# Patient Record
Sex: MALE | Race: WHITE | NOT HISPANIC OR LATINO | URBAN - NONMETROPOLITAN AREA
[De-identification: names, ages, dates, MRNs, and addresses within clinical notes are randomized per-mention and may not be internally consistent; named-entity substitution may affect disease eponyms.]

---

## 2023-10-15 ENCOUNTER — TRANSCRIBE ORDERS (OUTPATIENT)
Dept: ADMINISTRATIVE | Facility: HOSPITAL | Age: 73
End: 2023-10-15

## 2023-10-15 DIAGNOSIS — R94.2 ABNORMAL RESULTS OF PULMONARY FUNCTION STUDIES: ICD-10-CM

## 2023-10-15 DIAGNOSIS — J45.40 MODERATE PERSISTENT ASTHMA, UNCOMPLICATED: ICD-10-CM

## 2023-10-15 DIAGNOSIS — J30.1 ALLERGIC RHINITIS DUE TO POLLEN, UNSPECIFIED SEASONALITY: ICD-10-CM

## 2023-10-15 DIAGNOSIS — J30.89 OTHER ALLERGIC RHINITIS: Primary | ICD-10-CM

## 2023-10-15 DIAGNOSIS — R06.2 WHEEZING: ICD-10-CM

## 2024-07-12 ENCOUNTER — APPOINTMENT (OUTPATIENT)
Dept: CT IMAGING | Facility: HOSPITAL | Age: 74
End: 2024-07-12
Payer: MEDICARE

## 2024-07-12 ENCOUNTER — APPOINTMENT (OUTPATIENT)
Dept: GENERAL RADIOLOGY | Facility: HOSPITAL | Age: 74
End: 2024-07-12
Payer: MEDICARE

## 2024-07-12 ENCOUNTER — HOSPITAL ENCOUNTER (EMERGENCY)
Facility: HOSPITAL | Age: 74
Discharge: HOME | End: 2024-07-12
Payer: MEDICARE

## 2024-07-12 VITALS
WEIGHT: 210 LBS | HEIGHT: 70 IN | HEART RATE: 63 BPM | OXYGEN SATURATION: 98 % | SYSTOLIC BLOOD PRESSURE: 125 MMHG | DIASTOLIC BLOOD PRESSURE: 66 MMHG | BODY MASS INDEX: 30.06 KG/M2 | TEMPERATURE: 97.8 F | RESPIRATION RATE: 16 BRPM

## 2024-07-12 DIAGNOSIS — R06.02 SHORTNESS OF BREATH: Primary | ICD-10-CM

## 2024-07-12 LAB
ALBUMIN SERPL-MCNC: 4.2 G/DL (ref 3.5–5.2)
ALBUMIN/GLOB SERPL: 1 G/DL
ALP SERPL-CCNC: 94 U/L (ref 39–117)
ALT SERPL W P-5'-P-CCNC: 16 U/L (ref 1–41)
ANION GAP SERPL CALCULATED.3IONS-SCNC: 10 MMOL/L (ref 5–15)
APTT PPP: 31.3 SECONDS (ref 24.5–36)
ARTERIAL PATENCY WRIST A: POSITIVE
AST SERPL-CCNC: 23 U/L (ref 1–40)
ATMOSPHERIC PRESS: 755 MMHG
B PARAPERT DNA SPEC QL NAA+PROBE: NOT DETECTED
B PERT DNA SPEC QL NAA+PROBE: NOT DETECTED
BASE EXCESS BLDA CALC-SCNC: 4.4 MMOL/L (ref 0–2)
BASOPHILS # BLD AUTO: 0.03 10*3/MM3 (ref 0–0.2)
BASOPHILS NFR BLD AUTO: 0.4 % (ref 0–1.5)
BDY SITE: ABNORMAL
BILIRUB SERPL-MCNC: 0.4 MG/DL (ref 0–1.2)
BODY TEMPERATURE: 37
BUN SERPL-MCNC: 19 MG/DL (ref 8–23)
BUN/CREAT SERPL: 15.3 (ref 7–25)
C PNEUM DNA NPH QL NAA+NON-PROBE: NOT DETECTED
CA-I BLD-MCNC: 4.7 MG/DL (ref 4.6–5.4)
CALCIUM SPEC-SCNC: 9.5 MG/DL (ref 8.6–10.5)
CHLORIDE SERPL-SCNC: 101 MMOL/L (ref 98–107)
CO2 SERPL-SCNC: 31 MMOL/L (ref 22–29)
COHGB MFR BLD: 0.8 % (ref 0–5)
CREAT SERPL-MCNC: 1.24 MG/DL (ref 0.76–1.27)
D-LACTATE SERPL-SCNC: 1.4 MMOL/L (ref 0.5–2)
DEPRECATED RDW RBC AUTO: 41.7 FL (ref 37–54)
EGFRCR SERPLBLD CKD-EPI 2021: 61.8 ML/MIN/1.73
EOSINOPHIL # BLD AUTO: 0.14 10*3/MM3 (ref 0–0.4)
EOSINOPHIL NFR BLD AUTO: 1.9 % (ref 0.3–6.2)
ERYTHROCYTE [DISTWIDTH] IN BLOOD BY AUTOMATED COUNT: 12.8 % (ref 12.3–15.4)
FLUAV SUBTYP SPEC NAA+PROBE: NOT DETECTED
FLUBV RNA ISLT QL NAA+PROBE: NOT DETECTED
GLOBULIN UR ELPH-MCNC: 4.3 GM/DL
GLUCOSE SERPL-MCNC: 100 MG/DL (ref 65–99)
HADV DNA SPEC NAA+PROBE: NOT DETECTED
HCO3 BLDA-SCNC: 27.9 MMOL/L (ref 20–26)
HCOV 229E RNA SPEC QL NAA+PROBE: NOT DETECTED
HCOV HKU1 RNA SPEC QL NAA+PROBE: NOT DETECTED
HCOV NL63 RNA SPEC QL NAA+PROBE: NOT DETECTED
HCOV OC43 RNA SPEC QL NAA+PROBE: NOT DETECTED
HCT VFR BLD AUTO: 42.7 % (ref 37.5–51)
HCT VFR BLD CALC: 42.9 % (ref 38–51)
HGB BLD-MCNC: 14 G/DL (ref 13–17.7)
HGB BLDA-MCNC: 14 G/DL (ref 14–18)
HMPV RNA NPH QL NAA+NON-PROBE: NOT DETECTED
HOLD SPECIMEN: NORMAL
HPIV1 RNA ISLT QL NAA+PROBE: NOT DETECTED
HPIV2 RNA SPEC QL NAA+PROBE: NOT DETECTED
HPIV3 RNA NPH QL NAA+PROBE: NOT DETECTED
HPIV4 P GENE NPH QL NAA+PROBE: NOT DETECTED
IMM GRANULOCYTES # BLD AUTO: 0.03 10*3/MM3 (ref 0–0.05)
IMM GRANULOCYTES NFR BLD AUTO: 0.4 % (ref 0–0.5)
INR PPP: 0.99 (ref 0.91–1.09)
LYMPHOCYTES # BLD AUTO: 1.89 10*3/MM3 (ref 0.7–3.1)
LYMPHOCYTES NFR BLD AUTO: 25.2 % (ref 19.6–45.3)
Lab: ABNORMAL
M PNEUMO IGG SER IA-ACNC: NOT DETECTED
MCH RBC QN AUTO: 29.2 PG (ref 26.6–33)
MCHC RBC AUTO-ENTMCNC: 32.8 G/DL (ref 31.5–35.7)
MCV RBC AUTO: 89.1 FL (ref 79–97)
METHGB BLD QL: 0 % (ref 0–3)
MODALITY: ABNORMAL
MONOCYTES # BLD AUTO: 0.55 10*3/MM3 (ref 0.1–0.9)
MONOCYTES NFR BLD AUTO: 7.3 % (ref 5–12)
NEUTROPHILS NFR BLD AUTO: 4.86 10*3/MM3 (ref 1.7–7)
NEUTROPHILS NFR BLD AUTO: 64.8 % (ref 42.7–76)
NRBC BLD AUTO-RTO: 0 /100 WBC (ref 0–0.2)
NT-PROBNP SERPL-MCNC: 515.4 PG/ML (ref 0–900)
OXYHGB MFR BLDV: 95.5 % (ref 94–99)
PCO2 BLDA: 37 MM HG (ref 35–45)
PCO2 TEMP ADJ BLD: 37 MM HG (ref 35–45)
PH BLDA: 7.49 PH UNITS (ref 7.35–7.45)
PH, TEMP CORRECTED: 7.49 PH UNITS (ref 7.35–7.45)
PLATELET # BLD AUTO: 190 10*3/MM3 (ref 140–450)
PMV BLD AUTO: 10.5 FL (ref 6–12)
PO2 BLDA: 76 MM HG (ref 83–108)
PO2 TEMP ADJ BLD: 76 MM HG (ref 83–108)
POTASSIUM BLDA-SCNC: 3.2 MMOL/L (ref 3.5–5.2)
POTASSIUM SERPL-SCNC: 3.3 MMOL/L (ref 3.5–5.2)
PROT SERPL-MCNC: 8.5 G/DL (ref 6–8.5)
PROTHROMBIN TIME: 13.5 SECONDS (ref 11.8–14.8)
QT INTERVAL: 472 MS
QTC INTERVAL: 435 MS
RBC # BLD AUTO: 4.79 10*6/MM3 (ref 4.14–5.8)
RHINOVIRUS RNA SPEC NAA+PROBE: NOT DETECTED
RSV RNA NPH QL NAA+NON-PROBE: NOT DETECTED
SAO2 % BLDCOA: 96.2 % (ref 94–99)
SARS-COV-2 RNA NPH QL NAA+NON-PROBE: NOT DETECTED
SODIUM BLDA-SCNC: 143 MMOL/L (ref 136–145)
SODIUM SERPL-SCNC: 142 MMOL/L (ref 136–145)
TROPONIN T SERPL HS-MCNC: 18 NG/L
VENTILATOR MODE: ABNORMAL
WBC NRBC COR # BLD AUTO: 7.5 10*3/MM3 (ref 3.4–10.8)
WHOLE BLOOD HOLD COAG: NORMAL
WHOLE BLOOD HOLD SPECIMEN: NORMAL

## 2024-07-12 PROCEDURE — 36600 WITHDRAWAL OF ARTERIAL BLOOD: CPT

## 2024-07-12 PROCEDURE — 82375 ASSAY CARBOXYHB QUANT: CPT

## 2024-07-12 PROCEDURE — 25510000001 IOPAMIDOL 61 % SOLUTION: Performed by: NURSE PRACTITIONER

## 2024-07-12 PROCEDURE — 83880 ASSAY OF NATRIURETIC PEPTIDE: CPT | Performed by: NURSE PRACTITIONER

## 2024-07-12 PROCEDURE — 71260 CT THORAX DX C+: CPT

## 2024-07-12 PROCEDURE — 25010000002 FUROSEMIDE PER 20 MG: Performed by: NURSE PRACTITIONER

## 2024-07-12 PROCEDURE — 83050 HGB METHEMOGLOBIN QUAN: CPT

## 2024-07-12 PROCEDURE — 93005 ELECTROCARDIOGRAM TRACING: CPT | Performed by: FAMILY MEDICINE

## 2024-07-12 PROCEDURE — 84484 ASSAY OF TROPONIN QUANT: CPT | Performed by: NURSE PRACTITIONER

## 2024-07-12 PROCEDURE — 83605 ASSAY OF LACTIC ACID: CPT | Performed by: NURSE PRACTITIONER

## 2024-07-12 PROCEDURE — 71045 X-RAY EXAM CHEST 1 VIEW: CPT

## 2024-07-12 PROCEDURE — 80053 COMPREHEN METABOLIC PANEL: CPT | Performed by: NURSE PRACTITIONER

## 2024-07-12 PROCEDURE — 36415 COLL VENOUS BLD VENIPUNCTURE: CPT

## 2024-07-12 PROCEDURE — 0202U NFCT DS 22 TRGT SARS-COV-2: CPT | Performed by: NURSE PRACTITIONER

## 2024-07-12 PROCEDURE — 96374 THER/PROPH/DIAG INJ IV PUSH: CPT

## 2024-07-12 PROCEDURE — 85610 PROTHROMBIN TIME: CPT | Performed by: NURSE PRACTITIONER

## 2024-07-12 PROCEDURE — 85730 THROMBOPLASTIN TIME PARTIAL: CPT | Performed by: NURSE PRACTITIONER

## 2024-07-12 PROCEDURE — 87040 BLOOD CULTURE FOR BACTERIA: CPT | Performed by: NURSE PRACTITIONER

## 2024-07-12 PROCEDURE — 82805 BLOOD GASES W/O2 SATURATION: CPT

## 2024-07-12 PROCEDURE — 85025 COMPLETE CBC W/AUTO DIFF WBC: CPT | Performed by: NURSE PRACTITIONER

## 2024-07-12 PROCEDURE — 99285 EMERGENCY DEPT VISIT HI MDM: CPT

## 2024-07-12 RX ORDER — CETIRIZINE HYDROCHLORIDE 5 MG/1
5 TABLET ORAL DAILY
COMMUNITY

## 2024-07-12 RX ORDER — UBIDECARENONE 100 MG
100 CAPSULE ORAL DAILY
COMMUNITY

## 2024-07-12 RX ORDER — FUROSEMIDE 10 MG/ML
80 INJECTION INTRAMUSCULAR; INTRAVENOUS ONCE
Status: COMPLETED | OUTPATIENT
Start: 2024-07-12 | End: 2024-07-12

## 2024-07-12 RX ORDER — PREDNISONE 50 MG/1
50 TABLET ORAL DAILY
Qty: 5 TABLET | Refills: 0 | Status: SHIPPED | OUTPATIENT
Start: 2024-07-12 | End: 2024-07-17

## 2024-07-12 RX ORDER — ATORVASTATIN CALCIUM 20 MG/1
20 TABLET, FILM COATED ORAL DAILY
COMMUNITY

## 2024-07-12 RX ORDER — SODIUM CHLORIDE 0.9 % (FLUSH) 0.9 %
10 SYRINGE (ML) INJECTION AS NEEDED
Status: DISCONTINUED | OUTPATIENT
Start: 2024-07-12 | End: 2024-07-13 | Stop reason: HOSPADM

## 2024-07-12 RX ORDER — LOSARTAN POTASSIUM 50 MG/1
50 TABLET ORAL DAILY
COMMUNITY

## 2024-07-12 RX ORDER — FUROSEMIDE 40 MG/1
40 TABLET ORAL 2 TIMES DAILY
COMMUNITY

## 2024-07-12 RX ORDER — HYDROCHLOROTHIAZIDE 25 MG/1
25 TABLET ORAL DAILY
COMMUNITY

## 2024-07-12 RX ORDER — ASPIRIN 81 MG/1
81 TABLET ORAL DAILY
COMMUNITY

## 2024-07-12 RX ORDER — CARVEDILOL 3.12 MG/1
3.12 TABLET ORAL 2 TIMES DAILY WITH MEALS
COMMUNITY

## 2024-07-12 RX ADMIN — IOPAMIDOL 100 ML: 612 INJECTION, SOLUTION INTRAVENOUS at 21:40

## 2024-07-12 RX ADMIN — FUROSEMIDE 80 MG: 10 INJECTION, SOLUTION INTRAVENOUS at 20:06

## 2024-07-12 NOTE — ED PROVIDER NOTES
Subjective   History of Present Illness  Patient is a 74-year-old male who presents to the ER with chief complaints of shortness of breath.  He states he began experiencing hoarseness and a productive cough approximately 1 week ago.  He was evaluated by his PCP and had an outpatient CT scan of the thorax which revealed a subtle left basilar infiltrate with mild to moderate loculated left pleural effusion.  He was instructed to come to the ER for further evaluation.  He reports shortness of breath without any chest pain.  He states it feels somewhat uncomfortable because he is short of breath.  He has had no known fevers.  He reports worsening shortness of breath with any activity.  Patient has history of asthma and states he uses a trilogy at night and with worsening symptoms he also will use it during the morning time.  He states he is have to use trilogy both in the morning and at night with this onset of shortness of breath.  Past medical history significant for coronary artery disease, hyperlipidemia, hypertension        Review of Systems   Constitutional: Negative.  Negative for fever.   HENT:  Positive for congestion.    Respiratory:  Positive for cough and shortness of breath.    Cardiovascular:  Negative for chest pain.   Gastrointestinal: Negative.  Negative for abdominal pain, constipation, diarrhea, nausea and vomiting.   Genitourinary: Negative.  Negative for dysuria.   Musculoskeletal: Negative.  Negative for back pain.   Skin: Negative.    All other systems reviewed and are negative.      Past Medical History:   Diagnosis Date    Coronary artery disease     Hyperlipidemia     Hypertension        No Known Allergies    Past Surgical History:   Procedure Laterality Date    CERVICAL SPINE SURGERY      CORONARY ARTERY BYPASS GRAFT      HERNIA REPAIR         History reviewed. No pertinent family history.    Social History     Socioeconomic History    Marital status:    Tobacco Use    Smoking status:  Never   Substance and Sexual Activity    Alcohol use: Not Currently    Drug use: Not Currently           Objective   Physical Exam  Vitals and nursing note reviewed.   Constitutional:       General: He is not in acute distress.     Appearance: He is well-developed. He is not diaphoretic.   HENT:      Head: Atraumatic.      Nose: Nose normal.   Eyes:      General: No scleral icterus.     Conjunctiva/sclera: Conjunctivae normal.      Pupils: Pupils are equal, round, and reactive to light.   Neck:      Thyroid: No thyromegaly.      Vascular: No JVD.   Cardiovascular:      Rate and Rhythm: Normal rate and regular rhythm.      Heart sounds: Normal heart sounds. No murmur heard.  Pulmonary:      Effort: Respiratory distress present.      Breath sounds: Rales present. No wheezing.   Chest:      Chest wall: No tenderness.   Abdominal:      General: Bowel sounds are normal. There is no distension.      Palpations: Abdomen is soft. There is no mass.      Tenderness: There is no abdominal tenderness. There is no guarding or rebound.   Musculoskeletal:         General: Normal range of motion.      Cervical back: Normal range of motion and neck supple.   Lymphadenopathy:      Cervical: No cervical adenopathy.   Skin:     General: Skin is warm and dry.      Coloration: Skin is not pale.      Findings: No erythema or rash.   Neurological:      Mental Status: He is alert and oriented to person, place, and time.      Cranial Nerves: No cranial nerve deficit.      Coordination: Coordination normal.      Deep Tendon Reflexes: Reflexes are normal and symmetric.   Psychiatric:         Behavior: Behavior normal.         Thought Content: Thought content normal.         Judgment: Judgment normal.       Labs Reviewed   COMPREHENSIVE METABOLIC PANEL - Abnormal; Notable for the following components:       Result Value    Glucose 100 (*)     Potassium 3.3 (*)     CO2 31.0 (*)     All other components within normal limits    Narrative:     GFR  Normal >60  Chronic Kidney Disease <60  Kidney Failure <15    The GFR formula is only valid for adults with stable renal function between ages 18 and 70.   BLOOD GAS, ARTERIAL W/CO-OXIMETRY - Abnormal; Notable for the following components:    pH, Arterial 7.485 (*)     pO2, Arterial 76.0 (*)     HCO3, Arterial 27.9 (*)     Base Excess, Arterial 4.4 (*)     Potassium, Arterial 3.2 (*)     pH, Temp Corrected 7.485 (*)     pO2, Temperature Corrected 76.0 (*)     All other components within normal limits   RESPIRATORY PANEL PCR W/ COVID-19 (SARS-COV-2), NP SWAB IN UTM/VTP, 2 HR TAT - Normal    Narrative:     In the setting of a positive respiratory panel with a viral infection PLUS a negative procalcitonin without other underlying concern for bacterial infection, consider observing off antibiotics or discontinuation of antibiotics and continue supportive care. If the respiratory panel is positive for atypical bacterial infection (Bordetella pertussis, Chlamydophila pneumoniae, or Mycoplasma pneumoniae), consider antibiotic de-escalation to target atypical bacterial infection.   APTT - Normal   PROTIME-INR - Normal   SINGLE HS TROPONIN T - Normal    Narrative:     High Sensitive Troponin T Reference Range:  <14.0 ng/L- Negative Female for AMI  <22.0 ng/L- Negative Male for AMI  >=14 - Abnormal Female indicating possible myocardial injury.  >=22 - Abnormal Male indicating possible myocardial injury.   Clinicians would have to utilize clinical acumen, EKG, Troponin, and serial changes to determine if it is an Acute Myocardial Infarction or myocardial injury due to an underlying chronic condition.        BNP (IN-HOUSE) - Normal    Narrative:     This assay is used as an aid in the diagnosis of individuals suspected of having heart failure. It can be used as an aid in the diagnosis of acute decompensated heart failure (ADHF) in patients presenting with signs and symptoms of ADHF to the emergency department (ED). In addition,  NT-proBNP of <300 pg/mL indicates ADHF is not likely.    Age Range Result Interpretation  NT-proBNP Concentration (pg/mL:      <50             Positive            >450                   Gray                 300-450                    Negative             <300    50-75           Positive            >900                  Gray                300-900                  Negative            <300      >75             Positive            >1800                  Gray                300-1800                  Negative            <300   CBC WITH AUTO DIFFERENTIAL - Normal   LACTIC ACID, PLASMA - Normal   BLOOD CULTURE   BLOOD CULTURE   RAINBOW DRAW    Narrative:     The following orders were created for panel order [place] Draw.  Procedure                               Abnormality         Status                     ---------                               -----------         ------                     Green Top (Gel)[[id number]]                                   Final result               Lavender Top[[id number]]                                      Final result               Red Top[[id number]]                                           Final result               Davidson Top[[id number]]                                          Final result               Light Blue Top[[id number]]                                    Final result                 Please view results for these tests on the individual orders.   BLOOD GAS, ARTERIAL W/CO-OXIMETRY   GREEN TOP   LAVENDER TOP   RED TOP   GRAY TOP   LIGHT BLUE TOP   CBC AND DIFFERENTIAL    Narrative:     The following orders were created for panel order CBC & Differential.  Procedure                               Abnormality         Status                     ---------                               -----------         ------                     CBC Auto Differential[[id number]]        Normal              Final result                 Please view results for these tests on the individual orders.      CT Chest  With Contrast Diagnostic   Final Result   1. Very small left pleural effusion with peripheral subpleural lingular   and left lower lobe opacities for which rounded atelectasis and   inflammation is favored. Short-term follow-up CT chest in 3 months   recommended to document stability or improvement.   2. Prior mediastinal surgery. Borderline cardiomegaly.   3. Cholelithiasis. 4 mm nonobstructing superior left intrarenal stone.   Left renal cyst. Splenomegaly.       This report was signed and finalized on 7/12/2024 9:55 PM by Dr. Apurva Menjivar MD.          XR Chest 1 View   Final Result   1. Left mid and lower lobe opacities with small left pleural effusion.   Findings may relate to asymmetrical edema versus pneumonitis. Interval   mediastinal surgery with borderline cardiomegaly.           This report was signed and finalized on 7/12/2024 7:59 PM by Dr. Apurva Menjivar MD.               Procedures           ED Course  ED Course as of 07/13/24 1302   Fri Jul 12, 2024   1843 CT of the thorax without contrast performed on July 9, 2024 reveals a subtle left basilar infiltrates with a mild to moderate loculated left pleural effusion [TW]   1958 Workup remains pending. This will be a turn over for hanh valenzuela. [TW]   2000 Care of this patient was initiated by APPLE Werner.  Care of the patient was handed over to me pending CT imaging results and plans of admission to the hospital. [KF]   2100 Upon evaluation the patient patient reports he has been struggling with shortness of breath for several years.  He states that he has noticed an increase in general fatigue and chronically wanting to sleep more more over the last few months.  Patient reports he has also noticed an increase in his shortness of breath over the last month or so.  He states that he is seeing Dr. Rucker with the allergy clinic and was placed on Trelegy for treatment of allergies.  He states that he has not had any PFTs performed with no  formal diagnosis of asthma or COPD.  Patient reports he saw his primary care provider, Dr. Flood on Monday for worsening shortness of breath.  He states that he had a CT scan performed on Tuesday which revealed subtle left basilar infiltrates with a mild to moderate loculated left pleural effusion.  Patient reports he then followed up with  today in clinic who recommended that patient report to the ER here for further evaluation and treatment of continued shortness of breath.  Patient reports shortness of breath is worse with exertion but at rest when he is talking he also experiences shortness of breath.  Patient reports he does not require or have home oxygen.  He states that trilogy use is relatively new over the last few months.  Patient reports he has never been a smoker.  Reports only past medical history includes hypertension, hyperlipidemia, and CABG x 5 at [facility] about 6 years ago. [KF]   2225 Nursing staff reports that ambulatory saturation remained 94% and above on room air.  Patient requesting to be discharged.    Following ambulatory saturation I reevaluated the patient and discussed options that would include admission to the hospital for further evaluation and treatment of presenting complaints.  Patient states he does not want to be admitted to the hospital and wants to follow-up with his primary care provider on an outpatient basis.    I had an in-depth discussion with the patient as well as significant other present bedside regarding all lab and imaging results completed during today's ED encounter.  I discussed that patient will need to follow-up closely with primary care provider as well as pulmonology.  Discussed that patient will also be discharged with steroids that he will need to take once daily for 5 days.  Patient and wife were educated on concerning signs and symptoms that would warrant a quick return to the ED and both verbalized understanding of this. I answered all the  questions regarding the emergency department evaluation, diagnosis, and treatment plan in plain and simple language that was understandable. We discussed that due to always having some diagnostic uncertainty while in the ER, there is always a chance that symptoms may change or new symptoms may reveal themselves after being discharged. Because of this, I stressed the importance of Oj following up with their . Patient informed that appointment will need to be done by calling their office to set up an appointment within the next few days or as soon as reasonably possible so that the symptoms can be re-evaluated for improvement or for any other questions. I also gave Oj common sense return precautions and prompted patient to return to the emergency department within 24 - 48hrs if there are any new, worsening, or concerning symptoms. The patient verbalized understanding of the discharge instructions and agreed with them. Oj was discharged in stable condition.    [KF]      ED Course User Index  [KF] Kian Sifuentes, APPLE  [TW] Debbi Arreola APRN                                             Medical Decision Making  Patient is a 74-year-old male who presents to the ER with chief complaints of shortness of breath.  He states he began experiencing hoarseness and a productive cough approximately 1 week ago.  He was evaluated by his PCP and had an outpatient CT scan of the thorax which revealed a subtle left basilar infiltrate with mild to moderate loculated left pleural effusion.  He was instructed to come to the ER for further evaluation.  He reports shortness of breath without any chest pain.  He states it feels somewhat uncomfortable because he is short of breath.  He has had no known fevers.  He reports worsening shortness of breath with any activity.  Patient has history of asthma and states he uses a trilogy at night and with worsening symptoms he also will use it during the morning time.  He states he is  have to use trilogy both in the morning and at night with this onset of shortness of breath.  Past medical history significant for coronary artery disease, hyperlipidemia, hypertension  Differential diagnosis: Infiltrate, pleural effusion, ACS, and other     Labs Reviewed  COMPREHENSIVE METABOLIC PANEL - Abnormal; Notable for the following components:     Glucose                       100 (*)                Potassium                     3.3 (*)                CO2                           31.0 (*)            All other components within normal limits         Narrative: GFR Normal >60                  Chronic Kidney Disease <60                  Kidney Failure <15                                    The GFR formula is only valid for adults with stable renal function between ages 18 and 70.  APTT - Normal  PROTIME-INR - Normal  SINGLE HS TROPONIN T - Normal         Narrative: High Sensitive Troponin T Reference Range:                  <14.0 ng/L- Negative Female for AMI                  <22.0 ng/L- Negative Male for AMI                  >=14 - Abnormal Female indicating possible myocardial injury.                  >=22 - Abnormal Male indicating possible myocardial injury.                   Clinicians would have to utilize clinical acumen, EKG, Troponin, and serial changes to determine if it is an Acute Myocardial Infarction or myocardial injury due to an underlying chronic condition.                                       BNP (IN-HOUSE) - Normal         Narrative: This assay is used as an aid in the diagnosis of individuals suspected of having heart failure. It can be used as an aid in the diagnosis of acute decompensated heart failure (ADHF) in patients presenting with signs and symptoms of ADHF to the emergency department (ED). In addition, NT-proBNP of <300 pg/mL indicates ADHF is not likely.                                    Age Range Result Interpretation  NT-proBNP Concentration (pg/mL:                                                       <50             Positive            >450                                   Gray                 300-450                                    Negative             <300                                    50-75           Positive            >900                                  Davidson                300-900                                  Negative            <300                                                      >75             Positive            >1800                                  Davidson                300-1800                                  Negative            <300  CBC WITH AUTO DIFFERENTIAL - Normal  LACTIC ACID, PLASMA - Normal  BLOOD CULTURE  BLOOD CULTURE  RESPIRATORY PANEL PCR W/ COVID-19 (SARS-COV-2), NP SWAB IN UTM/VTP, 2 HR TAT  RAINBOW DRAW         Narrative: The following orders were created for panel order [place] Draw.                  Procedure                               Abnormality         Status                                     ---------                               -----------         ------                                     Green Top (Gel)[[id number]]                                   Final result                               Lavender Top[[id number]]                                      Final result                               Red Top[[id number]]                                           Final result                               Davidson Top[[id number]]                                          Final result                               Light Blue Top[[id number]]                                    Final result                                                 Please view results for these tests on the individual orders.  GREEN TOP  LAVENDER TOP  RED TOP  GRAY TOP  LIGHT BLUE TOP  CBC AND DIFFERENTIAL     XR Chest 1 View    (Results Pending)  CT Chest With Contrast Diagnostic    (Results Pending)     (At the other facility per review of paper- no CD available)  CT of the  thorax without contrast performed on July 9, 2024 reveals a subtle left basilar infiltrates with a mild to moderate loculated left pleural effusion    Problems Addressed:  Shortness of breath: complicated acute illness or injury    Amount and/or Complexity of Data Reviewed  Labs: ordered.  Radiology: ordered.  ECG/medicine tests: ordered.    Risk  Prescription drug management.        Final diagnoses:   Shortness of breath       ED Disposition  ED Disposition       ED Disposition   Discharge    Condition   Stable    Comment   --               Conor Goins MD  [address] [facility] [address]  [phone]    Schedule an appointment as soon as possible for a visit       El Quiroz MD  [address] [facility] [address]  [phone]    Schedule an appointment as soon as possible for a visit       [facility] EMERGENCY DEPARTMENT  [address] [place] [id number] [phone]    If symptoms worsen         Medication List        New Prescriptions      predniSONE 50 MG tablet  Commonly known as: DELTASONE  Take 1 tablet by mouth Daily for 5 days.               Where to Get Your Medications        These medications were sent to [facility]/pharmacy #[id number] - [place] - [address] AT [facility] [phone]  - [phone]   [address], [place] [address]      Phone: [phone]   predniSONE 50 MG tablet            Debbi Arreola, APRN  07/13/24 0922       Kian Sifuentes, APRN  07/13/24 1302

## 2024-07-13 NOTE — DISCHARGE INSTRUCTIONS
It was very nice to meet you, Oj. Thank you for allowing us to take care of you today at [facility].    Today you were seen in the emergency department for your symptoms. Please understand that an ER evaluation is just the start of your evaluation. We do the best we can, but we are often unable to fully find what is causing your symptoms from one evaluation.  Because of this, the goal is to determine whether you need to be evaluated in the hospital or if it is safe for you to go home and see other doctors provided such as primary care physicians or specialist on an outpatient basis.     Like we discussed, I strongly urge that you follow up with your primary care doctor and pulmonology. Please call their office to set up an appointment as soon as possible so that you can be re-evaluated for improvement in your symptoms or for any other questions.  I have provided the information needed, including phone number, to call to set up an appointment below in these discharge papers.     Educational material has also been provided in the following pages regarding what we have discussed today.     MEDICATIONS PRESCRIBED: Prednisone once daily for 5 days    Please return to the emergency room within 12-48 hours if you experience symptoms such as the following:   Fever, chills, chest pain or shortness of breath, pain with inspiration/expiration, pain that travels to your arms, neck or back, nausea, vomiting, severe headache, tearing pain in your chest, dizziness, feel as though you are about to pass out, OR if you have any worsening symptoms, or any other concerns.

## 2024-07-16 LAB
QT INTERVAL: 472 MS
QTC INTERVAL: 435 MS

## 2024-07-17 LAB
BACTERIA SPEC AEROBE CULT: NORMAL
BACTERIA SPEC AEROBE CULT: NORMAL

## 2024-07-31 ENCOUNTER — OFFICE VISIT (OUTPATIENT)
Dept: PULMONOLOGY | Facility: CLINIC | Age: 74
End: 2024-07-31
Payer: MEDICARE

## 2024-07-31 VITALS
WEIGHT: 214 LBS | HEIGHT: 70 IN | HEART RATE: 62 BPM | OXYGEN SATURATION: 98 % | BODY MASS INDEX: 30.64 KG/M2 | SYSTOLIC BLOOD PRESSURE: 124 MMHG | DIASTOLIC BLOOD PRESSURE: 80 MMHG

## 2024-07-31 DIAGNOSIS — J30.89 NON-SEASONAL ALLERGIC RHINITIS DUE TO OTHER ALLERGIC TRIGGER: ICD-10-CM

## 2024-07-31 DIAGNOSIS — J90 PLEURAL EFFUSION: ICD-10-CM

## 2024-07-31 DIAGNOSIS — R06.02 SHORTNESS OF BREATH: Primary | ICD-10-CM

## 2024-07-31 DIAGNOSIS — I51.89 DIASTOLIC DYSFUNCTION: ICD-10-CM

## 2024-07-31 DIAGNOSIS — R91.8 LUNG INFILTRATE: ICD-10-CM

## 2024-07-31 DIAGNOSIS — J45.40 MODERATE PERSISTENT ASTHMA WITHOUT COMPLICATION: ICD-10-CM

## 2024-07-31 DIAGNOSIS — Z79.899 CURRENT USE OF BETA BLOCKER: ICD-10-CM

## 2024-07-31 DIAGNOSIS — I25.10 CORONARY ARTERY DISEASE INVOLVING NATIVE HEART WITHOUT ANGINA PECTORIS, UNSPECIFIED VESSEL OR LESION TYPE: ICD-10-CM

## 2024-07-31 RX ORDER — FLUTICASONE FUROATE, UMECLIDINIUM BROMIDE AND VILANTEROL TRIFENATATE 100; 62.5; 25 UG/1; UG/1; UG/1
POWDER RESPIRATORY (INHALATION)
COMMUNITY
Start: 2024-06-03

## 2024-07-31 RX ORDER — ALBUTEROL SULFATE 90 UG/1
2 AEROSOL, METERED RESPIRATORY (INHALATION) EVERY 4 HOURS PRN
COMMUNITY

## 2024-07-31 NOTE — PROGRESS NOTES
Background:  Pt w dyspnea pleural effusion 7/2024, CAD, never smoker, mod pers asthma allergy on immunotherapy   Chief Complaint  Shortness of Breath    Subjective    History of Present Illness    Oj Vale presents to [facility].  History of Present Illness  I am asked to see him for dyspnea. Outside records and [facility] records are reviewed as follows:  There is hx allergy and moderate persistent asthma treated by Dr. Rucker.  He previously was on ics/laba therapy, changed to triple therapy, has been on allergy immunotherapy, which he has completed.  PFT was ordered by Dr. Rucker, although unfortunately I can't find result in any of the usually places in the epic system where such might be found.  He had an ED visit earlier this month at which time he had a small pleural effusion and very small lung infiltrate.  Previous echocardiogram showed normal LVEF, suggestion of diastolic dysfunction and mild pulmonary hypertension.  He is essentially a never smoker.  He is a .  Trelegy has not helped.  He has been losing voice sometime, worse since going onto higher dose of trelegy. He has been having some weakness.  He has been taking carvedilol for an extended time and has had dose escalated with resulting bradycardia, now on reduced dose.  He had a bypass operation at [place].  He had a negative sleep study in the past.     has a past medical history of Coronary artery disease, Hyperlipidemia, and Hypertension.   has a past surgical history that includes Coronary artery bypass graft; Cervical spine surgery; and Hernia repair.  family history includes COPD in his father.   reports that he has never smoked. He does not have any smokeless tobacco history on file. He reports that he does not currently use alcohol. He reports that he does not currently use drugs.  No Known Allergies  Current Outpatient Medications  "  Medication Instructions    albuterol sulfate  (90 Base) MCG/ACT inhaler 2 puffs, Inhalation, Every 4 Hours PRN    aspirin 81 mg, Oral, Daily    atorvastatin (LIPITOR) 20 mg, Oral, Daily    Budeson-Glycopyrrol-Formoterol (BREZTRI) 160-9-4.8 MCG/ACT aerosol inhaler 2 puffs, Inhalation, 2 Times Daily    carvedilol (COREG) 3.125 mg, Oral, 2 Times Daily With Meals    cetirizine (ZYRTEC) 5 mg, Oral, Daily    coenzyme Q10 100 mg, Oral, Daily    furosemide (LASIX) 40 mg, Oral, 2 Times Daily    hydroCHLOROthiazide 25 mg, Oral, Daily    losartan (COZAAR) 50 mg, Oral, Daily    Trelegy Ellipta 100-62.5-25 MCG/ACT inhaler INHALE 1 PUFF DAILY, RINSE MOUTH AFTER EACH USE      Objective     Vital Signs:   /80   Pulse 62   Ht 177.8 cm (70\")   Wt 97.1 kg (214 lb)   SpO2 98% Comment: RA  BMI 30.71 kg/m²   Physical Exam  Constitutional:       Appearance: Normal appearance. He is not ill-appearing or diaphoretic.   Eyes:      Extraocular Movements: Extraocular movements intact.   Pulmonary:      Effort: Pulmonary effort is normal. No respiratory distress.      Breath sounds: No wheezing, rhonchi or rales.   Skin:     Findings: No erythema or rash.   Neurological:      Mental Status: He is alert.      Result Review  Data Reviewed:  CT Chest With Contrast Diagnostic    Result Date: 7/12/2024  Impression: 1. Very small left pleural effusion with peripheral subpleural lingular and left lower lobe opacities for which rounded atelectasis and inflammation is favored. Short-term follow-up CT chest in 3 months recommended to document stability or improvement. 2. Prior mediastinal surgery. Borderline cardiomegaly. 3. Cholelithiasis. 4 mm nonobstructing superior left intrarenal stone. Left renal cyst. Splenomegaly.  This report was signed and finalized on 7/12/2024 9:55 PM by Dr. Apurva Menjivar MD.      XR Chest 1 View    Result Date: 7/12/2024  Impression: 1. Left mid and lower lobe opacities with small left pleural effusion. " Findings may relate to asymmetrical edema versus pneumonitis. Interval mediastinal surgery with borderline cardiomegaly.   This report was signed and finalized on 7/12/2024 7:59 PM by Dr. Apurva Menjivar MD.        Personal review of imaging : CT scan shows small posterior basal density on left, small pleural effusion; also present on cxr from 2024, not present on cxr in 2016    ECHOCARDIOGRAM - SCAN - TRANSTHOR [facility]-6.9.21 (06/09/2021)                    Assessment and Plan   Diagnoses and all orders for this visit:    1. Shortness of breath (Primary)  -     Walking Oximetry  -     Budeson-Glycopyrrol-Formoterol (BREZTRI) 160-9-4.8 MCG/ACT aerosol inhaler; Inhale 2 puffs 2 (Two) Times a Day.  Dispense: 5.9 g; Refill: 0    2. Diastolic dysfunction    3. Pleural effusion    4. Moderate persistent asthma without complication    5. Non-seasonal allergic rhinitis due to other allergic trigger    6. Coronary artery disease involving native heart without angina pectoris, unspecified vessel or lesion type    7. Current use of beta blocker    8. Lung infiltrate  -     CT Chest Without Contrast Diagnostic; Future    Will plan PFT, full study to compare with prior ones at Allergy/Immunology.  Anticipate evaluation in cardiology, upcoming  Don't suspect sleep apnea given report of negative study previously  Recommend rsv vaccination  Consider alternate beta blocker with more B1 specificity; will defer agent choice to prescriber  Change trelegy to breztri for 1 week, sample given to try.  Epic query indicates that is preferred.   He will call us back Monday to report response and we will send rx then if it is preferable to trelegy., which might be causing or contributing to his voice problem; hfa inhaler might do better than dpi with avoiding dysphonia.  Schedule 6mwt  Follow up ct    Follow Up   Return in about 5 weeks (around 9/4/2024) for full PFT, review CT.  Patient was given instructions and  counseling regarding his condition or for health maintenance advice. Please see specific information pulled into the AVS if appropriate.  80 minutes spent with patient management today.    Electronically signed by El Quiroz MD, 7/31/2024, 09:40 CDT

## 2024-07-31 NOTE — PROCEDURES
Walking Oximetry    Performed by: Celia Washington CMA  Authorized by: El Quiroz MD    Rest room air SAT %:  98  Exercise room air SAT %:  97

## 2024-07-31 NOTE — LETTER
July 31, 2024       No Recipients    Patient: Oj Vale   YOB: 1950   Date of Visit: 7/31/2024     Dear Dr. Garrison Recipients:    Thank you for referring Oj Vale to me for evaluation. Below are the relevant portions of my assessment and plan of care.    If you have questions, please do not hesitate to call me. I look forward to following Oj along with you.         Sincerely,        El Quiroz MD        CC:   No Recipients      Progress Notes:  Background:  Pt w dyspnea pleural effusion 7/2024, CAD, never smoker, mod pers asthma allergy on immunotherapy   Chief Complaint  Shortness of Breath    Subjective    History of Present Illness    Oj Vale presents to [facility].  History of Present Illness  I am asked to see him for dyspnea. Outside records and [facility] records are reviewed as follows:  There is hx allergy and moderate persistent asthma treated by Dr. Rucker.  He previously was on ics/laba therapy, changed to triple therapy, has been on allergy immunotherapy, which he has completed.  PFT was ordered by Dr. Rucker, although unfortunately I can't find result in any of the usually places in the epic system where such might be found.  He had an ED visit earlier this month at which time he had a small pleural effusion and very small lung infiltrate.  Previous echocardiogram showed normal LVEF, suggestion of diastolic dysfunction and mild pulmonary hypertension.  He is essentially a never smoker.  He is a .  Trelegy has not helped.  He has been losing voice sometime, worse since going onto higher dose of trelegy. He has been having some weakness.  He has been taking carvedilol for an extended time and has had dose escalated with resulting bradycardia, now on reduced dose.  He had a bypass operation at [place].  He had a negative sleep study in the past.     has a past  "medical history of Coronary artery disease, Hyperlipidemia, and Hypertension.   has a past surgical history that includes Coronary artery bypass graft; Cervical spine surgery; and Hernia repair.  family history includes COPD in his father.   reports that he has never smoked. He does not have any smokeless tobacco history on file. He reports that he does not currently use alcohol. He reports that he does not currently use drugs.  No Known Allergies  Current Outpatient Medications   Medication Instructions   • albuterol sulfate  (90 Base) MCG/ACT inhaler 2 puffs, Inhalation, Every 4 Hours PRN   • aspirin 81 mg, Oral, Daily   • atorvastatin (LIPITOR) 20 mg, Oral, Daily   • Budeson-Glycopyrrol-Formoterol (BREZTRI) 160-9-4.8 MCG/ACT aerosol inhaler 2 puffs, Inhalation, 2 Times Daily   • carvedilol (COREG) 3.125 mg, Oral, 2 Times Daily With Meals   • cetirizine (ZYRTEC) 5 mg, Oral, Daily   • coenzyme Q10 100 mg, Oral, Daily   • furosemide (LASIX) 40 mg, Oral, 2 Times Daily   • hydroCHLOROthiazide 25 mg, Oral, Daily   • losartan (COZAAR) 50 mg, Oral, Daily   • Trelegy Ellipta 100-62.5-25 MCG/ACT inhaler INHALE 1 PUFF DAILY, RINSE MOUTH AFTER EACH USE      Objective     Vital Signs:   /80   Pulse 62   Ht 177.8 cm (70\")   Wt 97.1 kg (214 lb)   SpO2 98% Comment: RA  BMI 30.71 kg/m²   Physical Exam  Constitutional:       Appearance: Normal appearance. He is not ill-appearing or diaphoretic.   Eyes:      Extraocular Movements: Extraocular movements intact.   Pulmonary:      Effort: Pulmonary effort is normal. No respiratory distress.      Breath sounds: No wheezing, rhonchi or rales.   Skin:     Findings: No erythema or rash.   Neurological:      Mental Status: He is alert.      Result Review  Data Reviewed:  CT Chest With Contrast Diagnostic    Result Date: 7/12/2024  Impression: 1. Very small left pleural effusion with peripheral subpleural lingular and left lower lobe opacities for which rounded atelectasis " and inflammation is favored. Short-term follow-up CT chest in 3 months recommended to document stability or improvement. 2. Prior mediastinal surgery. Borderline cardiomegaly. 3. Cholelithiasis. 4 mm nonobstructing superior left intrarenal stone. Left renal cyst. Splenomegaly.  This report was signed and finalized on 7/12/2024 9:55 PM by Dr. Apurva Menjivar MD.      XR Chest 1 View    Result Date: 7/12/2024  Impression: 1. Left mid and lower lobe opacities with small left pleural effusion. Findings may relate to asymmetrical edema versus pneumonitis. Interval mediastinal surgery with borderline cardiomegaly.   This report was signed and finalized on 7/12/2024 7:59 PM by Dr. Apurva Menjivar MD.        Personal review of imaging : CT scan shows small posterior basal density on left, small pleural effusion; also present on cxr from 2024, not present on cxr in 2016    ECHOCARDIOGRAM - SCAN - TRANSTHOR [facility]-6.9.21 (06/09/2021)                    Assessment and Plan   Diagnoses and all orders for this visit:    1. Shortness of breath (Primary)  -     Walking Oximetry  -     Budeson-Glycopyrrol-Formoterol (BREZTRI) 160-9-4.8 MCG/ACT aerosol inhaler; Inhale 2 puffs 2 (Two) Times a Day.  Dispense: 5.9 g; Refill: 0    2. Diastolic dysfunction    3. Pleural effusion    4. Moderate persistent asthma without complication    5. Non-seasonal allergic rhinitis due to other allergic trigger    6. Coronary artery disease involving native heart without angina pectoris, unspecified vessel or lesion type    7. Current use of beta blocker    8. Lung infiltrate  -     CT Chest Without Contrast Diagnostic; Future    Will plan PFT, full study to compare with prior ones at Allergy/Immunology.  Anticipate evaluation in cardiology, upcoming  Don't suspect sleep apnea given report of negative study previously  Recommend rsv vaccination  Consider alternate beta blocker with more B1 specificity; will defer agent  choice to prescriber  Change trelegy to breztri for 1 week, sample given to try.  Epic query indicates that is preferred.   He will call us back Monday to report response and we will send rx then if it is preferable to trelegy., which might be causing or contributing to his voice problem; hfa inhaler might do better than dpi with avoiding dysphonia.  Schedule 6mwt  Follow up ct    Follow Up   Return in about 5 weeks (around 9/4/2024) for full PFT, review CT.  Patient was given instructions and counseling regarding his condition or for health maintenance advice. Please see specific information pulled into the AVS if appropriate.  80 minutes spent with patient management today.    Electronically signed by El Quiroz MD, 7/31/2024, 09:40 CDT

## 2024-08-06 ENCOUNTER — TELEPHONE (OUTPATIENT)
Dept: PULMONOLOGY | Facility: CLINIC | Age: 74
End: 2024-08-06
Payer: MEDICARE

## 2024-08-06 DIAGNOSIS — R06.02 SHORTNESS OF BREATH: ICD-10-CM

## 2024-08-06 NOTE — TELEPHONE ENCOUNTER
He was saying he couldn't tell a difference with the two inhalers.   When he takes the breztri 2 puffs he gets the jitters but when he takes 1 puff he does fine.   Don't know if either is helping or hurting him.    Do you want him to take anything until his next appointment to see Margie on 09/03/2024.

## 2024-08-06 NOTE — TELEPHONE ENCOUNTER
Patient requested a script to be called into the [facility]      Rx Refill Note  Requested Prescriptions     Pending Prescriptions Disp Refills    Budeson-Glycopyrrol-Formoterol (BREZTRI) 160-9-4.8 MCG/ACT aerosol inhaler 10.7 g 1     Sig: Inhale 2 puffs 2 (Two) Times a Day.      Last office visit with prescribing clinician: 7/31/2024   Last telemedicine visit with prescribing clinician: Visit date not found   Next office visit with prescribing clinician: 09/03/2024                        Would you like a call back once the refill request has been completed: [] Yes [] No    If the office needs to give you a call back, can they leave a voicemail: [] Yes [] No    Celia Washington CMA  08/06/24, 16:36 CDT

## 2024-08-21 NOTE — PROGRESS NOTES
Chief Complaint  Shortness of Breath    Subjective    History of Present Illness {CC  Problem List  Visit Diagnosis   Encounters  Notes  Medications  Labs  Result Review Imaging  Media     Oj Vale presents to [facility] for:    History of Present Illness  Mr. Vale is here for follow up and management of asthma. He had been on immunotherapy for allergies but has stopped this recently. He tried Breztri at last office visit.  He got jittery with Breztri and was doing ok with it just taking one puff in the morning. He is unsure if he gets benefit from it or not and has stopped it as well due to sore throat. He did not do well with Trelegy it made his voice hoarse. He gets short of breath with exertion. He has to stop and take rest breaks. He doesn't sleep well at night but feels like he can't breathe through his nose. Reports negative sleep study in the past. He tells me he has gained several pounds in a few days. He feels like he is holding fluid in his abdomen. He reports soreness around his chest to touch which has been present for some time.  No correlation to this. CT chest recently stable.        Prior to Admission medications    Medication Sig Start Date End Date Taking? Authorizing Provider   albuterol sulfate  (90 Base) MCG/ACT inhaler Inhale 2 puffs Every 4 (Four) Hours As Needed for Wheezing.    Uriel Lynn MD   aspirin 81 MG EC tablet Take 1 tablet by mouth Daily.    ProviderUriel MD   atorvastatin (LIPITOR) 20 MG tablet Take 1 tablet by mouth Daily.    ProviderUriel MD   Budeson-Glycopyrrol-Formoterol (BREZTRI) 160-9-4.8 MCG/ACT aerosol inhaler Inhale 2 puffs 2 (Two) Times a Day. 8/6/24   El Quiroz MD   carvedilol (COREG) 3.125 MG tablet Take 1 tablet by mouth 2 (Two) Times a Day With Meals.    Uriel Lynn MD   cetirizine (zyrTEC) 5 MG tablet Take 1 tablet by mouth Daily.    " Uriel Lynn MD   coenzyme Q10 100 MG capsule Take 1 capsule by mouth Daily.    Uriel Lynn MD   furosemide (LASIX) 40 MG tablet Take 1 tablet by mouth 2 (Two) Times a Day.    Uriel Lynn MD   hydroCHLOROthiazide 25 MG tablet Take 1 tablet by mouth Daily.    Uriel Lynn MD   losartan (COZAAR) 50 MG tablet Take 1 tablet by mouth Daily.    Uriel Lynn MD       Social History     Socioeconomic History    Marital status:    Tobacco Use    Smoking status: Never    Smokeless tobacco: Never   Vaping Use    Vaping status: Never Used   Substance and Sexual Activity    Alcohol use: Not Currently    Drug use: Never    Sexual activity: Defer       Objective   Vital Signs:   /61   Pulse 57   Ht 172.7 cm (68\")   Wt 102 kg (225 lb)   SpO2 96% Comment: RA  BMI 34.21 kg/m²     Physical Exam  Constitutional:       General: He is not in acute distress.  HENT:      Head: Normocephalic.      Nose: Nose normal.      Mouth/Throat:      Mouth: Mucous membranes are moist.   Eyes:      General: No scleral icterus.  Cardiovascular:      Rate and Rhythm: Normal rate.   Pulmonary:      Effort: No respiratory distress.   Abdominal:      General: There is no distension.   Neurological:      Mental Status: He is alert and oriented to person, place, and time.   Psychiatric:         Mood and Affect: Mood normal.         Behavior: Behavior is cooperative.        Result Review :{ Labs  Result Review  Imaging  Med Tab  Media :    PFT Values          9/3/2024    09:15   Pre Drug PFT Results   FVC 58   FEV1 58   FEF 25-75% 56   FEV1/FVC 75   Other Tests PFT Results   TLC 68   RV 92   DLCO 88   D/VAsb 129     My interpretation of the PFT : moderate restriction     Results for orders placed in visit on 09/03/24    Spirometry with Diffusion Capacity & Lung Volumes    Narrative  Spirometry with Diffusion Capacity & Lung Volumes    Performed by: Celia Rajan RRT  Authorized by: " Margie Jones, APRN  Pre Drug % Predicted  FVC: 58%  FEV1: 58%  FEF 25-75%: 56%  FEV1/FVC: 75%  T%  RV: 92%  DLCO: 88%  D/VAsb: 129%    Rest/Exercise Pulse Ox Values          2024    09:15   Rest/Exercise Pulse Ox Results   Rest room air SAT % 98   Exercise room air SAT % 97              CT Chest Without Contrast Diagnostic (2024 14:10)   My interpretation of imaging:  lingular opacity stable, left lower lobe round atelectasis stable.       Assessment and Plan {CC Problem List  Visit Diagnosis  ROS  Review (Popup)  Health Maintenance  Quality  BestPractice  Medications  SmartSets  SnapShot Encounters  Media      Diagnoses and all orders for this visit:    1. Shortness of breath (Primary)  -     Spirometry with Diffusion Capacity & Lung Volumes    2. Moderate persistent asthma without complication  Comments:  continue albuterol as needed. Tried and failed Breztri and Trelegy. could consider Spiriva if albuterol prn doesn't control symptoms.  Orders:  -     Azelastine HCl 137 MCG/SPRAY solution; 1 spray into the nostril(s) as directed by provider 2 (Two) Times a Day for 30 days.  Dispense: 30 mL; Refill: 3  -     Overnight Sleep Oximetry Study; Future    3. Lung infiltrate  Comments:  we reviewed ct and compared to prior. Follow up ct in 6 months to ensure stability.  Orders:  -     CT Chest Without Contrast; Future          Plans as above. Office follow up in 6 months or sooner if needed.     APPLE Negron  9/3/2024  10:04 CDT    Follow Up {Instructions Charge Capture  Follow-up Communications   Return in about 6 months (around 3/3/2025) for or so with doc k .    Patient was given instructions and counseling regarding his condition or for health maintenance advice. Please see specific information pulled into the AVS if appropriate.

## 2024-08-22 ENCOUNTER — OFFICE VISIT (OUTPATIENT)
Dept: CARDIOLOGY | Facility: CLINIC | Age: 74
End: 2024-08-22
Payer: MEDICARE

## 2024-08-22 VITALS
SYSTOLIC BLOOD PRESSURE: 121 MMHG | HEIGHT: 70 IN | HEART RATE: 62 BPM | DIASTOLIC BLOOD PRESSURE: 65 MMHG | BODY MASS INDEX: 30.06 KG/M2 | WEIGHT: 210 LBS | OXYGEN SATURATION: 99 %

## 2024-08-22 DIAGNOSIS — I25.708 CORONARY ARTERY DISEASE OF BYPASS GRAFT OF NATIVE HEART WITH STABLE ANGINA PECTORIS: Primary | ICD-10-CM

## 2024-08-22 DIAGNOSIS — E78.5 HYPERLIPIDEMIA LDL GOAL <70: ICD-10-CM

## 2024-08-22 DIAGNOSIS — I50.32 CHRONIC DIASTOLIC CONGESTIVE HEART FAILURE: ICD-10-CM

## 2024-08-22 PROBLEM — I25.810 CORONARY ARTERY DISEASE INVOLVING CORONARY BYPASS GRAFT OF NATIVE HEART WITHOUT ANGINA PECTORIS: Status: ACTIVE | Noted: 2024-08-22

## 2024-08-22 PROCEDURE — 93000 ELECTROCARDIOGRAM COMPLETE: CPT | Performed by: INTERNAL MEDICINE

## 2024-08-22 PROCEDURE — 99204 OFFICE O/P NEW MOD 45 MIN: CPT | Performed by: INTERNAL MEDICINE

## 2024-08-22 RX ORDER — MONTELUKAST SODIUM 10 MG/1
10 TABLET ORAL NIGHTLY
COMMUNITY

## 2024-08-22 RX ORDER — FUROSEMIDE 40 MG
40 TABLET ORAL DAILY PRN
Start: 2024-08-22

## 2024-08-22 NOTE — PROGRESS NOTES
[facility] - CARDIOLOGY  New Patient Initial Outpatient Evaluation    Primary Care Physician: Conor Goins MD    Subjective     Chief Complaint: Establish care for known CAD    History of Present Illness  74-year-old male self-referred for establish care for ongoing surveillance of coronary artery disease.  We received records that I reviewed from [facility] detailing a 5 vessel CABG 8/1/2016.  Anatomy included LIMA to LAD, SVG to diagonal, SVG to OM, SVG sequential to RPDA and distal RCA).    Eight years ago, he underwent heart surgery in [place] and has since been under the care of a cardiologist's assistant in [place], [place]. Post-surgery, he was diagnosed with congestive heart failure. He has consulted another cardiologist a few times, primarily been followed by the cardiology clinic in [facility]).    He reports persistent leg weakness since the surgery, along with occasional pain, aches, and cramps in his legs. More frequently, he feels as though he might fall. Despite these issues, he remains active, working on his farm. He also experiences significant fatigue, muscle loss, and breathing difficulties. He monitors his heart rate and blood pressure at home and has noticed a slow heart rate in the mornings, sometimes dropping to the high 30s. He reduced his carvedilol dosage from 12.5 mg to 3.125 mg daily, which improved his morning condition but did not alleviate his leg weakness, fatigue, or shortness of breath. He takes Lasix once daily and an additional dose if he feels bloated. He does not experience leg swelling but has noticed weight fluctuations of 5 to 7 pounds within a day or two.    Prior to his surgery, he experienced shortness of breath and was initially misdiagnosed with heartburn. He did not experience chest discomfort before the surgery but now reports occasional chest heaviness, which he attributes to pneumonia rather than his heart. His symptoms have  not completely resolved post-surgery. He experiences shortness of breath even with minimal activity, but not at rest. He does not experience breathing difficulties when lying down. He occasionally experiences chest soreness on both sides, more so on the left, which can last for a few minutes and is sometimes accompanied by sweating. He does not experience nausea during these episodes.    He had an allergic reaction to a medication prescribed by a lung specialist, which caused throat swelling and difficulty eating. 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 He has limited sensation in his knees and arms. He once experienced severe leg swelling and was concerned about a possible blood clot, but tests confirmed normal blood flow. He was tested for sleep apnea 5 to 6 years ago, which was negative. He has noticed increased irritability since his bypass surgery. He contracted a staph infection post-surgery and was hospitalized for nearly a month.          Review of Systems   Constitutional: Positive for malaise/fatigue.   Cardiovascular:  Positive for chest pain and dyspnea on exertion. Negative for claudication, leg swelling, near-syncope, orthopnea, palpitations, paroxysmal nocturnal dyspnea and syncope.   Respiratory:  Positive for shortness of breath.    Hematologic/Lymphatic: Does not bruise/bleed easily.   Musculoskeletal:  Positive for muscle weakness (leg).        Otherwise complete ROS reviewed and negative except as mentioned in the HPI.      Past Medical History:   Past Medical History:   Diagnosis Date    Abnormal ECG     Arrhythmia     Atrial fibrillation     CHF (congestive heart failure)     Coronary artery disease     Heart murmur     Hyperlipidemia     Hypertension        Past Surgical History:  Past Surgical History:   Procedure Laterality Date    CARDIAC CATHETERIZATION      CERVICAL SPINE SURGERY      CORONARY ARTERY BYPASS GRAFT      HERNIA REPAIR         Family  "History: family history includes COPD in his father; Heart disease in his mother.    Social History:  reports that he has never smoked. He has never used smokeless tobacco. He reports that he does not currently use alcohol. He reports that he does not use drugs.    Medications:  Prior to Admission medications    Medication Sig Start Date End Date Taking? Authorizing Provider   albuterol sulfate  (90 Base) MCG/ACT inhaler Inhale 2 puffs Every 4 (Four) Hours As Needed for Wheezing.    Uriel Lynn MD   aspirin 81 MG EC tablet Take 1 tablet by mouth Daily.    Uriel Lynn MD   atorvastatin (LIPITOR) 20 MG tablet Take 1 tablet by mouth Daily.    Uriel Lynn MD   Budeson-Glycopyrrol-Formoterol (BREZTRI) 160-9-4.8 MCG/ACT aerosol inhaler Inhale 2 puffs 2 (Two) Times a Day. 8/6/24   El Quiroz MD   carvedilol (COREG) 3.125 MG tablet Take 1 tablet by mouth 2 (Two) Times a Day With Meals.    Uriel Lynn MD   cetirizine (zyrTEC) 5 MG tablet Take 1 tablet by mouth Daily.    Uriel Lynn MD   coenzyme Q10 100 MG capsule Take 1 capsule by mouth Daily.    Uriel Lynn MD   furosemide (LASIX) 40 MG tablet Take 1 tablet by mouth 2 (Two) Times a Day.    Uriel Lynn MD   hydroCHLOROthiazide 25 MG tablet Take 1 tablet by mouth Daily.    Uriel Lynn MD   losartan (COZAAR) 50 MG tablet Take 1 tablet by mouth Daily.    Uriel Lynn MD     Allergies:  Allergies   Allergen Reactions    Breztri Aerosphere [Budeson-Glycopyrrol-Formoterol] Other (See Comments)     Throat swelling up       Objective     Vital Signs: /65   Pulse 62   Ht 177.8 cm (70\")   Wt 95.3 kg (210 lb)   SpO2 99%   BMI 30.13 kg/m²     Vitals and nursing note reviewed.   Constitutional:       General: Not in acute distress.     Appearance: Not in distress.   Neck:      Vascular: No JVD or JVR. JVD normal.   Pulmonary:      Effort: Pulmonary effort is normal.      " Breath sounds: Normal breath sounds.   Cardiovascular:      Normal rate. Regular rhythm.      Murmurs: There is no murmur.      No gallop.  No rub.   Pulses:     Intact distal pulses.   Edema:     Peripheral edema absent.   Skin:     General: Skin is warm and dry.   Neurological:      Mental Status: Alert, oriented to person, place, and time and oriented to person, place and time.       Physical Exam      Results Reviewed:  Results  Imaging  Echocardiogram done in 2021 reported a normal EF.      ECG 12 Lead    Date/Time: 8/22/2024 8:02 AM  Performed by: Corey Chin MD    Authorized by: Corey Chin MD  Comparison: compared with previous ECG from 7/12/2024  Similar to previous ECG  Rhythm: sinus rhythm  BPM: 62  Other findings: non-specific ST-T wave changes    Clinical impression: abnormal EKG                Assessment / Plan        Problem List Items Addressed This Visit          Cardiac and Vasculature    Coronary artery disease of bypass graft of native heart with stable angina pectoris - Primary    Overview     5v cabg 8/1/16 (ST[facility]) - lima->LAD, svg->diag, svg->om, svg->RPDA->dRCA         Relevant Orders    Adult Transthoracic Echo Complete w/ Color, Spectral and Contrast if necessary per protocol    Stress Test With Myocardial Perfusion (1 Day)    ECG 12 Lead    Hyperlipidemia LDL goal <70    Chronic diastolic congestive heart failure    Relevant Orders    Adult Transthoracic Echo Complete w/ Color, Spectral and Contrast if necessary per protocol    Stress Test With Myocardial Perfusion (1 Day)       Assessment & Plan  1.  Coronary artery disease, status post CABG: He has been reporting shortness of breath with activity since his CABG 8 years ago, but also is describing some chest heaviness lately, that occurs both at rest and with exertion.  He reports significant shortness of breath with activity, which has been ongoing since his CABG 8 years ago.   -Continue baseline therapy for CAD including aspirin  81 mg daily and statin.  -The latest cholesterol levels are unknown. Depending on the results of the upcoming tests and symptom improvement, the atorvastatin dose may be increased to 40 mg or discontinued if it is felt to be contributing to his symptoms (leg weakness).  Regardless of type of medicine, aggressive lipid-lowering therapy will be necessary to achieve an LDL goal of less than 70.  -an echocardiogram and a stress test (Lexiscan) have been ordered to investigate symptoms of shortness of breath and chest heaviness    2.  Potential medication side effect: He is complaining of persistent bilateral leg weakness, fatigue, and somnolence.  -Will advise discontinuation of carvedilol at this time to see if any above symptoms improved   -Will advise repeating a sleep study (says he had one 5-6 years ago that was not diagnostic for sleep apnea) if somnolence and fatigue did not improve after stopping carvedilol    3. Chronic HFpEF: Stable.  Euvolemic.  No signs or symptoms of congestive heart failure at present.  Continue to monitor for signs of volume overload, and maintain good risk factor control including blood pressure control.      Follow-up  The patient will follow up after we see the results of his echocardiogram and stress test.        Transcribed from ambient dictation for Corey Chin MD by Corey Chin MD.  08/22/24   07:14 CDT    Patient or patient representative verbalized consent to the visit recording.

## 2024-08-30 ENCOUNTER — OFFICE VISIT (OUTPATIENT)
Dept: PULMONOLOGY | Facility: CLINIC | Age: 74
End: 2024-08-30
Payer: MEDICARE

## 2024-08-30 ENCOUNTER — HOSPITAL ENCOUNTER (OUTPATIENT)
Dept: CT IMAGING | Facility: HOSPITAL | Age: 74
Discharge: HOME | End: 2024-08-30
Payer: MEDICARE

## 2024-08-30 DIAGNOSIS — R91.8 LUNG INFILTRATE: ICD-10-CM

## 2024-08-30 DIAGNOSIS — R06.02 SHORTNESS OF BREATH: Primary | ICD-10-CM

## 2024-08-30 PROCEDURE — 71250 CT THORAX DX C-: CPT

## 2024-08-30 NOTE — PROCEDURES
6 Minute Walk Test    Performed by: Stu Wilkes CMA  Authorized by: El Quiroz MD    General:     - Medical History Checked      - Medical clearance provided for the patient to participate in exercise testing      Contraindications:    - No contraindications identified       Exercise Details     Supplemental oxygen:  NA    Mobility aid:  NA    Hallway: Total Feet:  880    Miles:  0.166    Meters:  268.22    Rest, Exercise, Recovery Readings    Rest     BP: 132/68    SAT: 98%    O2: RA    HR: 61    RPE: 1   Finish     BP: 144/80    SAT: 97%    O2: RA    HR: 72    RPE: 4   Recovery 1     BP: 138/80    SAT: 98%    O2: RA    HR: 64    RPE:  2   Recovery 2     BP:  138/78    SAT:  98%    O2: RA    HR:  62    RPE: 1    Minute by Minute Recordings    1st Minute     SAT: 94%    O2: RA    HR: 84    RPE:  4   2nd minute     SAT: 96%    O2: RA    HR: 94    RPE: 4   3rd minute     SAT: 94%    O2: RA    HR: 94    RPE: 4   4th minute     SAT: 95%    O2: RA    HR: 95    RPE: 4   5th minute     SAT: 96%    O2: RA    HR: 76    RPE: 4   6th minute     SAT: 95%    O2: RA    HR: 89    RPE: 4    Was test terminated?: No        Technician:  Stu TOURE       Overall notes:  Patient walked in the hallway for six minutes. His total distance walked was 880 feet or 268.22 meters. Patient's oxygen level stayed between 94%-98% and did not need oxygen during the test.

## 2024-09-03 ENCOUNTER — OFFICE VISIT (OUTPATIENT)
Dept: PULMONOLOGY | Facility: CLINIC | Age: 74
End: 2024-09-03
Payer: MEDICARE

## 2024-09-03 VITALS
WEIGHT: 225 LBS | OXYGEN SATURATION: 96 % | BODY MASS INDEX: 34.1 KG/M2 | HEART RATE: 57 BPM | DIASTOLIC BLOOD PRESSURE: 61 MMHG | SYSTOLIC BLOOD PRESSURE: 122 MMHG | HEIGHT: 68 IN

## 2024-09-03 DIAGNOSIS — R06.02 SHORTNESS OF BREATH: Primary | ICD-10-CM

## 2024-09-03 DIAGNOSIS — R91.8 LUNG INFILTRATE: Chronic | ICD-10-CM

## 2024-09-03 DIAGNOSIS — J45.40 MODERATE PERSISTENT ASTHMA WITHOUT COMPLICATION: Chronic | ICD-10-CM

## 2024-09-03 DIAGNOSIS — R06.02 SHORTNESS OF BREATH: Primary | Chronic | ICD-10-CM

## 2024-09-03 PROCEDURE — 94727 GAS DIL/WSHOT DETER LNG VOL: CPT | Performed by: NURSE PRACTITIONER

## 2024-09-03 PROCEDURE — 1159F MED LIST DOCD IN RCRD: CPT | Performed by: NURSE PRACTITIONER

## 2024-09-03 PROCEDURE — 1160F RVW MEDS BY RX/DR IN RCRD: CPT | Performed by: NURSE PRACTITIONER

## 2024-09-03 PROCEDURE — 94375 RESPIRATORY FLOW VOLUME LOOP: CPT | Performed by: NURSE PRACTITIONER

## 2024-09-03 PROCEDURE — 99214 OFFICE O/P EST MOD 30 MIN: CPT | Performed by: NURSE PRACTITIONER

## 2024-09-03 PROCEDURE — 94729 DIFFUSING CAPACITY: CPT | Performed by: NURSE PRACTITIONER

## 2024-09-03 RX ORDER — AZELASTINE HYDROCHLORIDE 137 UG/1
1 SPRAY, METERED NASAL 2 TIMES DAILY
Qty: 30 ML | Refills: 3 | Status: SHIPPED | OUTPATIENT
Start: 2024-09-03 | End: 2024-10-03

## 2024-09-03 NOTE — PROCEDURES
Spirometry with Diffusion Capacity & Lung Volumes    Performed by: Celia Rajan, RRT  Authorized by: Margie Jones, APRN     Pre Drug % Predicted    FVC: 58%   FEV1: 58%   FEF 25-75%: 56%   FEV1/FVC: 75%   T%   RV: 92%   DLCO: 88%   D/VAsb: 129%    Interpretation   Spirometry   Spirometry shows moderate restriction. There is reduced midflow suggesting small airway/airflow obstruction.   Review of FVL curve   Patient's effort is normal.   Lung Volume Measurements  Measurements show reduced lung volumes consistent with restriction.   Diffusion Capacity  The patient's diffusion capacity is normal.  Diffusion capacity is normal when corrected for alveolar volume.

## 2024-09-12 DIAGNOSIS — J45.40 MODERATE PERSISTENT ASTHMA WITHOUT COMPLICATION: Chronic | ICD-10-CM

## 2024-09-20 ENCOUNTER — HOSPITAL ENCOUNTER (OUTPATIENT)
Dept: CARDIOLOGY | Facility: HOSPITAL | Age: 74
Discharge: HOME | End: 2024-09-20
Payer: MEDICARE

## 2024-09-20 VITALS
BODY MASS INDEX: 34.1 KG/M2 | DIASTOLIC BLOOD PRESSURE: 65 MMHG | HEIGHT: 68 IN | SYSTOLIC BLOOD PRESSURE: 121 MMHG | WEIGHT: 225 LBS

## 2024-09-20 DIAGNOSIS — I50.32 CHRONIC DIASTOLIC CONGESTIVE HEART FAILURE: ICD-10-CM

## 2024-09-20 DIAGNOSIS — I25.708 CORONARY ARTERY DISEASE OF BYPASS GRAFT OF NATIVE HEART WITH STABLE ANGINA PECTORIS: ICD-10-CM

## 2024-09-20 LAB
BH CV ECHO MEAS - AO MAX PG: 8.1 MMHG
BH CV ECHO MEAS - AO MEAN PG: 4 MMHG
BH CV ECHO MEAS - AO ROOT DIAM: 3.2 CM
BH CV ECHO MEAS - AO V2 MAX: 142 CM/SEC
BH CV ECHO MEAS - AO V2 VTI: 29.4 CM
BH CV ECHO MEAS - AVA(I,D): 3 CM2
BH CV ECHO MEAS - EDV(CUBED): 145.5 ML
BH CV ECHO MEAS - EDV(MOD-SP2): 103 ML
BH CV ECHO MEAS - EDV(MOD-SP4): 123 ML
BH CV ECHO MEAS - EF(MOD-BP): 63.1 %
BH CV ECHO MEAS - EF(MOD-SP2): 59.9 %
BH CV ECHO MEAS - EF(MOD-SP4): 66.3 %
BH CV ECHO MEAS - ESV(CUBED): 17.2 ML
BH CV ECHO MEAS - ESV(MOD-SP2): 41.3 ML
BH CV ECHO MEAS - ESV(MOD-SP4): 41.5 ML
BH CV ECHO MEAS - FS: 51 %
BH CV ECHO MEAS - IVS/LVPW: 1.01 CM
BH CV ECHO MEAS - IVSD: 1.17 CM
BH CV ECHO MEAS - LA DIMENSION: 5.4 CM
BH CV ECHO MEAS - LAT PEAK E' VEL: 11.9 CM/SEC
BH CV ECHO MEAS - LV DIASTOLIC VOL/BSA (35-75): 57.2 CM2
BH CV ECHO MEAS - LV MASS(C)D: 243.3 GRAMS
BH CV ECHO MEAS - LV MAX PG: 4.8 MMHG
BH CV ECHO MEAS - LV MEAN PG: 2 MMHG
BH CV ECHO MEAS - LV SYSTOLIC VOL/BSA (12-30): 19.3 CM2
BH CV ECHO MEAS - LV V1 MAX: 110 CM/SEC
BH CV ECHO MEAS - LV V1 VTI: 23.1 CM
BH CV ECHO MEAS - LVIDD: 5.3 CM
BH CV ECHO MEAS - LVIDS: 2.6 CM
BH CV ECHO MEAS - LVOT AREA: 3.8 CM2
BH CV ECHO MEAS - LVOT DIAM: 2.2 CM
BH CV ECHO MEAS - LVPWD: 1.16 CM
BH CV ECHO MEAS - MED PEAK E' VEL: 7.7 CM/SEC
BH CV ECHO MEAS - MR MAX PG: 95.6 MMHG
BH CV ECHO MEAS - MR MAX VEL: 489 CM/SEC
BH CV ECHO MEAS - MR MEAN PG: 56 MMHG
BH CV ECHO MEAS - MR MEAN VEL: 343 CM/SEC
BH CV ECHO MEAS - MR VTI: 154 CM
BH CV ECHO MEAS - MV A MAX VEL: 52.9 CM/SEC
BH CV ECHO MEAS - MV DEC TIME: 0.41 SEC
BH CV ECHO MEAS - MV E MAX VEL: 59.7 CM/SEC
BH CV ECHO MEAS - MV E/A: 1.13
BH CV ECHO MEAS - PI END-D VEL: 134 CM/SEC
BH CV ECHO MEAS - RAP SYSTOLE: 3 MMHG
BH CV ECHO MEAS - RVSP: 30.2 MMHG
BH CV ECHO MEAS - SV(LVOT): 87.8 ML
BH CV ECHO MEAS - SV(MOD-SP2): 61.7 ML
BH CV ECHO MEAS - SV(MOD-SP4): 81.5 ML
BH CV ECHO MEAS - SVI(LVOT): 40.9 ML/M2
BH CV ECHO MEAS - SVI(MOD-SP2): 28.7 ML/M2
BH CV ECHO MEAS - SVI(MOD-SP4): 37.9 ML/M2
BH CV ECHO MEAS - TR MAX PG: 27.2 MMHG
BH CV ECHO MEAS - TR MAX VEL: 261 CM/SEC
BH CV ECHO MEASUREMENTS AVERAGE E/E' RATIO: 6.09
BH CV XLRA - RV BASE: 4.3 CM
BH CV XLRA - RV LENGTH: 6.6 CM
BH CV XLRA - RV MID: 3.6 CM
LEFT ATRIUM VOLUME INDEX: 39 ML/M2
LEFT ATRIUM VOLUME: 83.9 ML

## 2024-09-20 PROCEDURE — 93306 TTE W/DOPPLER COMPLETE: CPT

## 2024-09-20 PROCEDURE — 25510000001 PERFLUTREN 6.52 MG/ML SUSPENSION: Performed by: INTERNAL MEDICINE

## 2024-09-20 RX ADMIN — PERFLUTREN 9.78 MG: 6.52 INJECTION, SUSPENSION INTRAVENOUS at 10:58

## 2024-09-24 LAB
BH CV ECHO MEAS - AO MAX PG: 8.1 MMHG
BH CV ECHO MEAS - AO MEAN PG: 4 MMHG
BH CV ECHO MEAS - AO ROOT DIAM: 3.2 CM
BH CV ECHO MEAS - AO V2 MAX: 142 CM/SEC
BH CV ECHO MEAS - AO V2 VTI: 29.4 CM
BH CV ECHO MEAS - AVA(I,D): 3 CM2
BH CV ECHO MEAS - EDV(CUBED): 145.5 ML
BH CV ECHO MEAS - EDV(MOD-SP2): 103 ML
BH CV ECHO MEAS - EDV(MOD-SP4): 123 ML
BH CV ECHO MEAS - EF(MOD-BP): 63.1 %
BH CV ECHO MEAS - EF(MOD-SP2): 59.9 %
BH CV ECHO MEAS - EF(MOD-SP4): 66.3 %
BH CV ECHO MEAS - ESV(CUBED): 17.2 ML
BH CV ECHO MEAS - ESV(MOD-SP2): 41.3 ML
BH CV ECHO MEAS - ESV(MOD-SP4): 41.5 ML
BH CV ECHO MEAS - FS: 51 %
BH CV ECHO MEAS - IVS/LVPW: 1.01 CM
BH CV ECHO MEAS - IVSD: 1.17 CM
BH CV ECHO MEAS - LA DIMENSION: 5.4 CM
BH CV ECHO MEAS - LAT PEAK E' VEL: 11.9 CM/SEC
BH CV ECHO MEAS - LV DIASTOLIC VOL/BSA (35-75): 57.2 CM2
BH CV ECHO MEAS - LV MASS(C)D: 243.3 GRAMS
BH CV ECHO MEAS - LV MAX PG: 4.8 MMHG
BH CV ECHO MEAS - LV MEAN PG: 2 MMHG
BH CV ECHO MEAS - LV SYSTOLIC VOL/BSA (12-30): 19.3 CM2
BH CV ECHO MEAS - LV V1 MAX: 110 CM/SEC
BH CV ECHO MEAS - LV V1 VTI: 23.1 CM
BH CV ECHO MEAS - LVIDD: 5.3 CM
BH CV ECHO MEAS - LVIDS: 2.6 CM
BH CV ECHO MEAS - LVOT AREA: 3.8 CM2
BH CV ECHO MEAS - LVOT DIAM: 2.2 CM
BH CV ECHO MEAS - LVPWD: 1.16 CM
BH CV ECHO MEAS - MED PEAK E' VEL: 7.7 CM/SEC
BH CV ECHO MEAS - MR MAX PG: 95.6 MMHG
BH CV ECHO MEAS - MR MAX VEL: 489 CM/SEC
BH CV ECHO MEAS - MR MEAN PG: 56 MMHG
BH CV ECHO MEAS - MR MEAN VEL: 343 CM/SEC
BH CV ECHO MEAS - MR VTI: 154 CM
BH CV ECHO MEAS - MV A MAX VEL: 52.9 CM/SEC
BH CV ECHO MEAS - MV DEC TIME: 0.41 SEC
BH CV ECHO MEAS - MV E MAX VEL: 59.7 CM/SEC
BH CV ECHO MEAS - MV E/A: 1.13
BH CV ECHO MEAS - PI END-D VEL: 134 CM/SEC
BH CV ECHO MEAS - RAP SYSTOLE: 10 MMHG
BH CV ECHO MEAS - RVSP: 37 MMHG
BH CV ECHO MEAS - SV(LVOT): 87.8 ML
BH CV ECHO MEAS - SV(MOD-SP2): 61.7 ML
BH CV ECHO MEAS - SV(MOD-SP4): 81.5 ML
BH CV ECHO MEAS - SVI(LVOT): 40.9 ML/M2
BH CV ECHO MEAS - SVI(MOD-SP2): 28.7 ML/M2
BH CV ECHO MEAS - SVI(MOD-SP4): 37.9 ML/M2
BH CV ECHO MEAS - TAPSE (>1.6): 1.3 CM
BH CV ECHO MEAS - TR MAX PG: 27.2 MMHG
BH CV ECHO MEAS - TR MAX VEL: 261 CM/SEC
BH CV ECHO MEASUREMENTS AVERAGE E/E' RATIO: 6.09
BH CV XLRA - RV BASE: 4.3 CM
BH CV XLRA - RV LENGTH: 6.6 CM
BH CV XLRA - RV MID: 3.6 CM
LEFT ATRIUM VOLUME INDEX: 39 ML/M2
LEFT ATRIUM VOLUME: 83.9 ML

## 2024-09-27 ENCOUNTER — HOSPITAL ENCOUNTER (OUTPATIENT)
Dept: CARDIOLOGY | Facility: HOSPITAL | Age: 74
Discharge: HOME | End: 2024-09-27
Payer: MEDICARE

## 2024-09-27 VITALS
SYSTOLIC BLOOD PRESSURE: 115 MMHG | HEIGHT: 68 IN | WEIGHT: 224.87 LBS | DIASTOLIC BLOOD PRESSURE: 64 MMHG | BODY MASS INDEX: 34.08 KG/M2 | HEART RATE: 55 BPM

## 2024-09-27 DIAGNOSIS — I25.708 CORONARY ARTERY DISEASE OF BYPASS GRAFT OF NATIVE HEART WITH STABLE ANGINA PECTORIS: ICD-10-CM

## 2024-09-27 DIAGNOSIS — I50.32 CHRONIC DIASTOLIC CONGESTIVE HEART FAILURE: ICD-10-CM

## 2024-09-27 PROCEDURE — A9502 TC99M TETROFOSMIN: HCPCS | Performed by: INTERNAL MEDICINE

## 2024-09-27 PROCEDURE — 0 TECHNETIUM TETROFOSMIN KIT: Performed by: INTERNAL MEDICINE

## 2024-09-27 PROCEDURE — 78452 HT MUSCLE IMAGE SPECT MULT: CPT

## 2024-09-27 PROCEDURE — 93017 CV STRESS TEST TRACING ONLY: CPT

## 2024-09-27 PROCEDURE — 25010000002 REGADENOSON 0.4 MG/5ML SOLUTION: Performed by: INTERNAL MEDICINE

## 2024-09-27 RX ORDER — REGADENOSON 0.08 MG/ML
0.4 INJECTION, SOLUTION INTRAVENOUS ONCE
Status: COMPLETED | OUTPATIENT
Start: 2024-09-27 | End: 2024-09-27

## 2024-09-27 RX ADMIN — TETROFOSMIN 1 DOSE: 1.38 INJECTION, POWDER, LYOPHILIZED, FOR SOLUTION INTRAVENOUS at 08:47

## 2024-09-27 RX ADMIN — REGADENOSON 0.4 MG: 0.08 INJECTION, SOLUTION INTRAVENOUS at 09:44

## 2024-09-27 RX ADMIN — TETROFOSMIN 1 DOSE: 1.38 INJECTION, POWDER, LYOPHILIZED, FOR SOLUTION INTRAVENOUS at 10:04

## 2024-09-29 LAB
BH CV REST NUCLEAR ISOTOPE DOSE: 10.4 MCI
BH CV STRESS BP STAGE 1: NORMAL
BH CV STRESS COMMENTS STAGE 1: NORMAL
BH CV STRESS DOSE REGADENOSON STAGE 1: 0.4
BH CV STRESS DURATION MIN STAGE 1: 0
BH CV STRESS DURATION SEC STAGE 1: 10
BH CV STRESS HR STAGE 1: 73
BH CV STRESS NUCLEAR ISOTOPE DOSE: 34.4 MCI
BH CV STRESS PROTOCOL 1: NORMAL
BH CV STRESS RECOVERY BP: NORMAL MMHG
BH CV STRESS RECOVERY HR: 64 BPM
BH CV STRESS STAGE 1: 1
LV EF NUC BP: 63 %
MAXIMAL PREDICTED HEART RATE: 148 BPM
PERCENT MAX PREDICTED HR: 49.32 %
STRESS BASELINE BP: NORMAL MMHG
STRESS BASELINE HR: 55 BPM
STRESS PERCENT HR: 58 %
STRESS POST EXERCISE DUR MIN: 0 MIN
STRESS POST EXERCISE DUR SEC: 10 SEC
STRESS POST PEAK BP: NORMAL MMHG
STRESS POST PEAK HR: 73 BPM
STRESS TARGET HR: 126 BPM

## 2024-09-30 ENCOUNTER — TELEPHONE (OUTPATIENT)
Dept: CARDIOLOGY | Facility: CLINIC | Age: 74
End: 2024-09-30

## 2024-09-30 NOTE — TELEPHONE ENCOUNTER
Caller: Magdy Vale    Relationship: Self    Best call back number: [phone]     Caller requesting test results: MAGDY    What test was performed: STRESS TEST    When was the test performed: 9.29.24    Where was the test performed: [facility]    Additional notes: PT NEEDS TO KNOW IF HE NEEDS TO BE SEEN AFTER THIS TESTING OR NOT. SCHEDULE IS HECTIC RIGHT NOW SO HE NEEDS TO KNOW ASAP

## 2024-11-29 DIAGNOSIS — J45.40 MODERATE PERSISTENT ASTHMA WITHOUT COMPLICATION: Chronic | ICD-10-CM

## 2024-12-02 RX ORDER — AZELASTINE HYDROCHLORIDE 137 UG/1
SPRAY, METERED NASAL
Qty: 30 ML | Refills: 1 | Status: SHIPPED | OUTPATIENT
Start: 2024-12-02

## 2024-12-02 NOTE — TELEPHONE ENCOUNTER
Rx Refill Note  Requested Prescriptions     Pending Prescriptions Disp Refills    Azelastine HCl 137 MCG/SPRAY solution [Pharmacy Med Name: AZELASTINE 0.1% (137 MCG) SPRY] 30 mL 1     Sig: INSTILL 1 SPRAY INTO THE NOSTRIL(S) AS DIRECTED BY PROVIDER 2 (TWO) TIMES A DAY FOR 30 DAYS.      Last office visit with prescribing clinician: 9/3/2024   Last telemedicine visit with prescribing clinician: Visit date not found   Next office visit with prescribing clinician: Visit date not found                         Would you like a call back once the refill request has been completed: [] Yes [] No    If the office needs to give you a call back, can they leave a voicemail: [] Yes [] No    Shalini Laws MA  12/02/24, 10:03 CST

## 2025-01-03 ENCOUNTER — OFFICE VISIT (OUTPATIENT)
Dept: CARDIOLOGY | Facility: CLINIC | Age: 75
End: 2025-01-03
Payer: MEDICARE

## 2025-01-03 VITALS
HEIGHT: 68 IN | SYSTOLIC BLOOD PRESSURE: 130 MMHG | DIASTOLIC BLOOD PRESSURE: 75 MMHG | OXYGEN SATURATION: 98 % | WEIGHT: 220 LBS | BODY MASS INDEX: 33.34 KG/M2 | HEART RATE: 65 BPM

## 2025-01-03 DIAGNOSIS — E78.5 HYPERLIPIDEMIA LDL GOAL <70: ICD-10-CM

## 2025-01-03 DIAGNOSIS — I50.32 CHRONIC DIASTOLIC CONGESTIVE HEART FAILURE: ICD-10-CM

## 2025-01-03 DIAGNOSIS — I25.708 CORONARY ARTERY DISEASE OF BYPASS GRAFT OF NATIVE HEART WITH STABLE ANGINA PECTORIS: Primary | ICD-10-CM

## 2025-01-03 PROCEDURE — 99214 OFFICE O/P EST MOD 30 MIN: CPT | Performed by: INTERNAL MEDICINE

## 2025-01-03 PROCEDURE — 1159F MED LIST DOCD IN RCRD: CPT | Performed by: INTERNAL MEDICINE

## 2025-01-03 PROCEDURE — 93000 ELECTROCARDIOGRAM COMPLETE: CPT | Performed by: INTERNAL MEDICINE

## 2025-01-03 PROCEDURE — 1160F RVW MEDS BY RX/DR IN RCRD: CPT | Performed by: INTERNAL MEDICINE

## 2025-01-03 RX ORDER — RANOLAZINE 500 MG/1
500 TABLET, EXTENDED RELEASE ORAL 2 TIMES DAILY
Qty: 60 TABLET | Refills: 11 | Status: SHIPPED | OUTPATIENT
Start: 2025-01-03

## 2025-01-03 RX ORDER — ATORVASTATIN CALCIUM 40 MG/1
40 TABLET, FILM COATED ORAL DAILY
Qty: 90 TABLET | Refills: 3 | Status: SHIPPED | OUTPATIENT
Start: 2025-01-03

## 2025-01-03 NOTE — PROGRESS NOTES
Subjective:     Encounter Date:01/03/2025      Patient ID: Oj Vale is a 74 y.o. male.    Chief Complaint: f/u cad, MARTÍNEZ  History of Present Illness  The patient is a 74-year-old male who presents for follow-up of coronary artery disease.    He first established care in 08/2023 for known coronary disease, including a 5-vessel CABG performed at [facility] in [place] in 2016. At the initial visit, he reported leg aches and pains but remained active. He also experienced fatigue, breathing issues, and chest heaviness both at rest and with activity. A nuclear perfusion stress test and echocardiogram were performed to assess for cardiac causes of dyspnea. The nuclear stress test was low risk for ischemia, and the echocardiogram showed a normal systolic ejection fraction with a mildly dilated right atrium and right ventricle with mildly dilated right ventricular systolic function. There is also a hint of an ASD noted on the left. He reports no significant changes in his condition since his last visit in 08/2023. He continues to experience intermittent breathing difficulties, which he attributes to respiratory issues rather than cardiac ones. He has discontinued all inhalers due to perceived worsening of symptoms and reports feeling better since stopping them. Despite this, he still experiences some breathing difficulties, particularly during exertion. He also reports occasional heart palpitations, a symptom he has experienced for as long as he can remember. His exercise tolerance remains limited, with short walks or slight inclines causing significant breathlessness. He experiences mild chest heaviness and unexplained chest soreness, which can occur spontaneously or after physical activity. 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 He takes diuretics intermittently, typically every 2 to 3 days, and notes a correlation between fluid retention  and increased weight and breathing difficulties. He reports a weight gain of 5 to 6 pounds over a few days before taking the diuretic. He remains active and engaged in various activities. He was taking Lasix once a day and an additional dose if needed. He is currently on losartan and baby aspirin but has been off his cholesterol medication for the past 3 months due to a lack of prescription refills.    He has been dealing with congestion and cough for the past 2 months, which improved with steroids and antibiotics but have not completely resolved. He requests a refill of his antibiotics.    He reports three blackened toenails on his left foot and a lack of sensation in his feet, which he attributes to neuropathy. He stepped on a staple while decorating a [employer] tree, which he did not notice until later due to the lack of sensation in his feet. He used the same antibiotics prescribed for his respiratory symptoms to treat the resulting wound, which he reports as beneficial. He also reports a spot on his lower back, about the size of his thumbnail, which he believes is causing nerve irritation. He experiences significant leg weakness, which has been evaluated and found to be normal.    MEDICATIONS  Current: Losartan, baby aspirin.  Discontinued: Lasix.      The following portions of the patient's history were reviewed and updated as appropriate: allergies, current medications, past family history, past medical history, past social history, past surgical history, and problem list.    Review of Systems   Constitutional: Negative for malaise/fatigue.   Cardiovascular:  Positive for chest pain, dyspnea on exertion and palpitations. Negative for claudication, leg swelling, near-syncope, orthopnea, paroxysmal nocturnal dyspnea and syncope.   Respiratory:  Negative for shortness of breath.    Hematologic/Lymphatic: Does not bruise/bleed easily.           Current Outpatient Medications:     aspirin 81 MG EC tablet, Take 1  tablet by mouth Daily., Disp: , Rfl:     atorvastatin (LIPITOR) 40 MG tablet, Take 1 tablet by mouth Daily., Disp: 90 tablet, Rfl: 3    cetirizine (zyrTEC) 5 MG tablet, Take 1 tablet by mouth Daily., Disp: , Rfl:     coenzyme Q10 100 MG capsule, Take 1 capsule by mouth Daily., Disp: , Rfl:     furosemide (LASIX) 40 MG tablet, Take 1 tablet by mouth Daily As Needed (weight gain >3 lbs in a day, or >5 lbs in 2 days). Takes once to twice daily, Disp: , Rfl:     losartan (COZAAR) 50 MG tablet, Take 1 tablet by mouth Daily., Disp: , Rfl:     MAGNESIUM CHLORIDE PO, Take  by mouth., Disp: , Rfl:     montelukast (SINGULAIR) 10 MG tablet, Take 1 tablet by mouth Every Night., Disp: , Rfl:     ranolazine (Ranexa) 500 MG 12 hr tablet, Take 1 tablet by mouth 2 (Two) Times a Day., Disp: 60 tablet, Rfl: 11       Objective:      Vitals:    01/03/25 1138   BP: 130/75   Pulse: 65   SpO2: 98%     Vitals and nursing note reviewed.   Constitutional:       General: Not in acute distress.     Appearance: Not in distress.   Neck:      Vascular: No JVD or JVR. JVD normal.   Pulmonary:      Effort: Pulmonary effort is normal.      Breath sounds: Normal breath sounds.   Cardiovascular:      Normal rate. Regular rhythm.      Murmurs: There is no murmur.      No gallop.  No rub.   Pulses:     Intact distal pulses.   Edema:     Peripheral edema absent.   Skin:     General: Skin is warm and dry.   Neurological:      Mental Status: Alert, oriented to person, place, and time and oriented to person, place and time.       Physical Exam      Lab Review:         ECG 12 Lead    Date/Time: 1/3/2025 5:01 PM  Performed by: Corey Chin MD    Authorized by: Corey Chin MD  Comparison: compared with previous ECG from 8/22/2024  Similar to previous ECG  Rhythm: sinus rhythm  BPM: 65  Other findings: non-specific ST-T wave changes and poor R wave progression    Clinical impression: abnormal EKG  Comments: Poor quality  tracing    Results  Imaging  Nuclear perfusion stress test was low risk for ischemia.       Results for orders placed during the hospital encounter of 09/20/24    Adult Transthoracic Echo Complete w/ Color, Spectral and Contrast if necessary per protocol    Interpretation Summary    Left ventricular systolic function is normal. Left ventricular ejection fraction appears to be 61 - 65%.    Left ventricular wall thickness is consistent with mild concentric hypertrophy.    The left atrial cavity is moderately dilated.    Estimated right ventricular systolic pressure from tricuspid regurgitation is mildly elevated (35-45 mmHg).    The right ventricular cavity is mildly dilated, with mildly reduced systolic function.    The right atrial cavity is dilated.    Cannot exclude atrial septal defect.    No prior studies available for comparison.        Assessment/Plan:     Problem List Items Addressed This Visit (all established)         Cardiac and Vasculature    Coronary artery disease of bypass graft of native heart with stable angina pectoris - Primary: Unclear if symptoms are anginal equivalent; will empirically treat as per below    Overview     5v cabg 8/1/16 (W) - lima->LAD, svg->diag, svg->om, svg->RPDA->dRCA         Relevant Medications    ranolazine (Ranexa) 500 MG 12 hr tablet    Hyperlipidemia LDL goal <70: Has not been on treatment    Relevant Medications    atorvastatin (LIPITOR) 40 MG tablet    Chronic diastolic congestive heart failure: Stable; appears euvolemic    Right ventricular enlargement: Noted on echo.  Suspicion for ASD.         Recommendations/plans:    Assessment & Plan    Patient continues to complain of some mild chest discomfort with activity and exertional dyspnea.  He is not volume overloaded.    I do not think his symptoms nor exam support a diagnosis of congestive heart failure. The left ventricular function appears normal, but there is a slight enlargement of the right side of the heart.  "This could potentially be due to a small atrial septal defect (ASD), although the presence of this is not certain; most recent echocardiogram images were reviewed in the room with the patient. The ASD, if present, may be contributing to his respiratory symptoms.  Given prior CABG, despite more recent lower stress test, we must consider his symptoms and anginal equivalent as well.    Given all the possible etiologies, today we have recommended the following:  -In case symptoms are anginal equivalent, initiate Ranexa 500 mg twice daily.   -Plan phone follow-up in 2 weeks to see if symptoms are any better once this has been initiated  -Based on his response to Ranexa initiation, if still symptomatic at all, we will then consider diagnostic left and right heart catheterization with full oxygenation run to assess for \"step up\" and oxygenation on right heart catheterization.  If significant \"step up\" is seen, I would suggest left-to-right shunting through the VSD may be playing impact, and I would then recommend STEPHEN for further anatomic evaluation.    Regarding known CAD, continue 81 mg daily of aspirin, losartan, and statin.  Of note, he has been without his cholesterol medication for the past 3 months. A prescription for a 3-month supply of his cholesterol medication has been sent to [facility].         Follow-up will be determined after the phone follow-up in 2 weeks as noted above; pending this, will otherwise tentatively plan to see patient back in office in 3 months.      Corey Chin MD  01/03/2025  12:31 CST    Transcribed from ambient dictation for Corey Chin MD by Corey Chin MD.  01/03/25   12:31 CST    Patient or patient representative verbalized consent to the visit recording.  "

## 2025-01-06 ENCOUNTER — TELEPHONE (OUTPATIENT)
Dept: PULMONOLOGY | Facility: CLINIC | Age: 75
End: 2025-01-06
Payer: MEDICARE

## 2025-01-09 ENCOUNTER — TELEPHONE (OUTPATIENT)
Dept: PULMONOLOGY | Facility: CLINIC | Age: 75
End: 2025-01-09
Payer: MEDICARE

## 2025-01-09 DIAGNOSIS — J30.89 NON-SEASONAL ALLERGIC RHINITIS DUE TO OTHER ALLERGIC TRIGGER: Primary | ICD-10-CM

## 2025-01-09 RX ORDER — AZELASTINE HYDROCHLORIDE 137 UG/1
1 SPRAY, METERED NASAL 2 TIMES DAILY PRN
Qty: 90 ML | Refills: 3 | Status: SHIPPED | OUTPATIENT
Start: 2025-01-09 | End: 2025-04-09

## 2025-01-09 RX ORDER — AZELASTINE HYDROCHLORIDE 137 UG/1
SPRAY, METERED NASAL
COMMUNITY
End: 2025-01-09 | Stop reason: SDUPTHER

## 2025-01-09 NOTE — TELEPHONE ENCOUNTER
Please send a 90day supply. Thank you   Requested Prescriptions      No prescriptions requested or ordered in this encounter      Last office visit with prescribing clinician: 9/3/2024   Last telemedicine visit with prescribing clinician: Visit date not found   Next office visit with prescribing clinician: Visit date not found                         Would you like a call back once the refill request has been completed: [] Yes [] No    If the office needs to give you a call back, can they leave a voicemail: [] Yes [] No    Anita Babin MA  01/09/25, 10:10 CST

## 2025-01-30 ENCOUNTER — TELEPHONE (OUTPATIENT)
Dept: CARDIOLOGY | Facility: CLINIC | Age: 75
End: 2025-01-30
Payer: MEDICARE

## 2025-01-30 NOTE — TELEPHONE ENCOUNTER
"Patient reports having \"spinning, headaches and SOB\" for the last 7-10 days. This happens with or without activity. He denies any sinus congestion, but says his breathing and chest tightness are both worse now than before. Please advise. Hermelinda Jones MA    "

## 2025-01-30 NOTE — TELEPHONE ENCOUNTER
Corey Chin MD  You39 minutes ago (3:35 PM)       Does he think these are new symptoms, possibly side effects, from the Ranexa that we started?  If so, he can stop taking the medication and call us back in 1 week to let us know if symptoms are better.      Also, has he checked his blood pressure at all?      Certainly, if anything significantly worsens and he is having significant trouble breathing, advised evaluation in the emergency room.     Patient believes the symptoms are due to taking Ranexa. He didn't take one this morning and has not had a headache all day. He will hold off on taking it for a week and record his blood pressure (he has not taken his bp when the symptoms occur), then call back to notify us of any changes/improvement. He will report to the ED if his breathing and/or chest tightness worsen significantly. Hermelinda Jones MA

## 2025-02-11 RX ORDER — LOSARTAN POTASSIUM 50 MG/1
50 TABLET ORAL DAILY
Qty: 90 TABLET | Refills: 3 | Status: SHIPPED | OUTPATIENT
Start: 2025-02-11

## 2025-03-04 ENCOUNTER — HOSPITAL ENCOUNTER (OUTPATIENT)
Dept: CT IMAGING | Facility: HOSPITAL | Age: 75
Discharge: HOME | End: 2025-03-04
Admitting: NURSE PRACTITIONER
Payer: MEDICARE

## 2025-03-04 ENCOUNTER — OFFICE VISIT (OUTPATIENT)
Dept: PULMONOLOGY | Facility: CLINIC | Age: 75
End: 2025-03-04
Payer: MEDICARE

## 2025-03-04 VITALS
OXYGEN SATURATION: 98 % | HEIGHT: 68 IN | BODY MASS INDEX: 32.89 KG/M2 | WEIGHT: 217 LBS | HEART RATE: 63 BPM | DIASTOLIC BLOOD PRESSURE: 84 MMHG | SYSTOLIC BLOOD PRESSURE: 128 MMHG

## 2025-03-04 DIAGNOSIS — R91.8 LUNG INFILTRATE: Chronic | ICD-10-CM

## 2025-03-04 DIAGNOSIS — R06.02 SHORTNESS OF BREATH: ICD-10-CM

## 2025-03-04 DIAGNOSIS — R16.1 SPLENOMEGALY: ICD-10-CM

## 2025-03-04 DIAGNOSIS — J90 PLEURAL EFFUSION: ICD-10-CM

## 2025-03-04 DIAGNOSIS — J98.4 RESTRICTIVE LUNG DISEASE: Primary | ICD-10-CM

## 2025-03-04 PROCEDURE — 71250 CT THORAX DX C-: CPT

## 2025-03-04 NOTE — PROGRESS NOTES
"Background:  Pt w dyspnea pleural effusion 7/2024, CAD, never smoker, mod pers asthma allergy on immunotherapy   Chief Complaint  Shortness of Breath    Subjective    History of Present Illness     Oj ARTEAGA Kavin is here for follow up with [facility].  History of Present Illness  Pt has seen Dr Chin for evaluation of cardiac causes of CHF.  There has been consideration for HFpEF but a the most recent visit Dr. Chin did not feel like he was having CHF related symptoms.  He did not improve with Ranexa and did not improve with empiric trial of inhalers.  He had restrictive PFT.  There was concern for pneumonia again at hospital in [place]. He has had a lot of steroids.  They did not help his breathing.  He has had a few pneumonia cases over the year.     Tobacco Use: Low Risk  (3/4/2025)    Patient History     Smoking Tobacco Use: Never     Smokeless Tobacco Use: Never     Passive Exposure: Not on file      Current Outpatient Medications   Medication Instructions    aspirin 81 mg, Daily    atorvastatin (LIPITOR) 40 mg, Oral, Daily    Azelastine HCl 137 mcg, Nasal, 2 Times Daily PRN    cetirizine (ZYRTEC) 5 mg, Daily    coenzyme Q10 100 mg, Daily    furosemide (LASIX) 40 mg, Oral, Daily PRN, Takes once to twice daily    losartan (COZAAR) 50 mg, Oral, Daily    MAGNESIUM CHLORIDE PO Take  by mouth.    montelukast (SINGULAIR) 10 mg, Nightly      Objective     Vital Signs:   /84   Pulse 63   Ht 172.7 cm (67.99\")   Wt 98.4 kg (217 lb)   SpO2 98% Comment: RA  BMI 33.00 kg/m²   Physical Exam  Constitutional:       Appearance: He is not ill-appearing or diaphoretic.   Pulmonary:      Effort: No respiratory distress.      Breath sounds: No wheezing.   Neurological:      Mental Status: He is alert.        Result Review  Data Reviewed:  CT Chest Without Contrast Diagnostic (08/30/2024 14:10)   1. Similar bandlike opacity at the lingula and pleural-based opacity " of  the LEFT lower lobe suggesting scarring and round atelectasis  respectively. Similar small LEFT pleural fluid. Recommend follow-up in  6-9 months or follow-up per pulmonology.     2. Changes of CABG. Cardiomegaly. Scattered coronary artery  calcifications.     3. Incidental findings in the abdomen including splenomegaly,  nonobstructing LEFT renal calculus, gallstone.      CT Chest Without Contrast Diagnostic    Result Date: 3/4/2025  Impression:  1.  No change in parenchymal scarring in the left upper and lower lobes. No change in chronic small left-sided pleural effusion with pleural thickening and adjacent area of left lower lobe rounded atelectasis.  2.  Cholelithiasis and splenomegaly noted in the included portion of the upper abdomen.   This report was signed and finalized on 3/4/2025 7:22 AM by Dr. Carlos Luna MD.       PFT Values          9/3/2024    09:15   Pre Drug PFT Results   FVC 58   FEV1 58   FEF 25-75% 56   FEV1/FVC 75   Other Tests PFT Results   TLC 68   RV 92   DLCO 88   D/VAsb 129                Assessment and Plan    Diagnoses and all orders for this visit:    1. Restrictive lung disease (Primary)  -     Spirometry with Diffusion Capacity; Future  -     CT Chest Without Contrast Diagnostic; Future    2. Shortness of breath    3. Pleural effusion    4. Splenomegaly    He has not responded to empiric rx directed toward obstructive lung disease  We reviewed his imaging and PFT showing restriction.  I do think he has round atelectasis in LLL with small chronic pleural effusion.  We discussed there is not a specific treatment in that case. Will plan follow up surveillance imaging and PFT.  Call for any acute changes.    Follow Up   Return in about 6 months (around 9/4/2025) for review CT.  Patient was given instructions and counseling regarding his condition or for health maintenance advice. Please see specific information pulled into the AVS if appropriate.    Electronically signed by El  Bishnu Quiroz MD, 3/4/2025, 17:20 CST

## 2025-04-03 ENCOUNTER — OFFICE VISIT (OUTPATIENT)
Dept: CARDIOLOGY | Facility: CLINIC | Age: 75
End: 2025-04-03
Payer: MEDICARE

## 2025-04-03 VITALS
HEART RATE: 73 BPM | OXYGEN SATURATION: 99 % | DIASTOLIC BLOOD PRESSURE: 78 MMHG | HEIGHT: 68 IN | WEIGHT: 221 LBS | SYSTOLIC BLOOD PRESSURE: 150 MMHG | BODY MASS INDEX: 33.49 KG/M2

## 2025-04-03 DIAGNOSIS — I25.708 CORONARY ARTERY DISEASE OF BYPASS GRAFT OF NATIVE HEART WITH STABLE ANGINA PECTORIS: Primary | ICD-10-CM

## 2025-04-03 DIAGNOSIS — I51.7 RIGHT VENTRICULAR ENLARGEMENT: ICD-10-CM

## 2025-04-03 DIAGNOSIS — E78.5 HYPERLIPIDEMIA LDL GOAL <70: ICD-10-CM

## 2025-04-03 DIAGNOSIS — R06.09 DOE (DYSPNEA ON EXERTION): ICD-10-CM

## 2025-04-03 DIAGNOSIS — I50.32 CHRONIC DIASTOLIC CONGESTIVE HEART FAILURE: ICD-10-CM

## 2025-04-03 RX ORDER — ISOSORBIDE MONONITRATE 30 MG/1
30 TABLET, EXTENDED RELEASE ORAL DAILY
Qty: 30 TABLET | Refills: 11 | Status: SHIPPED | OUTPATIENT
Start: 2025-04-03

## 2025-04-03 NOTE — PROGRESS NOTES
Subjective:     Encounter Date:04/03/2025      Patient ID: Oj Vale is a 74 y.o. male.    Chief Complaint: follow-up of coronary disease, exertional dyspnea  History of Present Illness  74-year-old male returns for routine follow-up of coronary disease.  He first established care in 08/2023 for known coronary disease, including a 5-vessel CABG performed at [facility] in [place] in 2016. At the initial visit, he reported leg aches and pains but remained active. He also experienced fatigue, breathing issues, and chest heaviness both at rest and with activity. A nuclear perfusion stress test and echocardiogram were performed to assess for cardiac causes of dyspnea. The nuclear stress test was low risk for ischemia, and the echocardiogram showed a normal systolic ejection fraction with a mildly dilated right atrium and right ventricle with mildly dilated right ventricular systolic function. There is also a hint of an ASD noted on the left.     When last seen here in January 3, 2025, he was complaining of mild chest discomfort with activity and exertional dyspnea but was not volume overloaded.  He just had a low stress test that was low risk for ischemia, but given his known CAD, I was still worried symptoms might be an anginal equivalent so started Ranexa 500 mg twice daily. We called back in 2 weeks he was reporting shortness of breath with or without activity, and other symptoms that he felt like or side effects from the Ranexa.  Therefore we advised that he stop taking the Ranexa, and on a telephone follow-up a week later, reported his breathing was much better after stopping it.  However, he was still complaining of having no energy at all, but attributed that to a sinus infection.  At that point (on phone encounter with original encounter date 01/30/2025), we advised for him to call us back in a week or 2 to let us know if his complaint of having no energy was better when he recovered from  "what he reported to be a sinus infection.  Lastly, at the visit here on 1/3/2025, it was learned that he had been out of statin for about 3 months so he was started back on this.  When we heard from him about the array of potential side effects noted above, we also asked whether or not he had started back on the statin therapy.  We never did hear back from him and he returns today for routine follow-up.    He remains short of breath with activity.  He did see Dr. Quiroz last month, his notes are reviewed.  He mentioned how the patient not responded to empiric therapy directed towards obstructive lung disease, and mention reviewing imaging and PFTs showing restriction.  He also noted \"I do think he has round atelectasis in LLL with's mild chronic pleural effusion.\"    Presenting today for follow-up with his wife, he reports persistent dyspnea, which he attributes to a combination of fatigue and respiratory distress. He had a consultation with Dr. Quiroz last month, during which it was suggested that his lung condition might be contributing to his symptoms. However, he continues to experience significant breathing difficulties, particularly when lying on his right side. He does not report any chest discomfort, tightness, heaviness, pressure, or squeezing sensations but acknowledges breathlessness. He also reports occasional severe headaches. He has a history of bypass surgery and recalls difficulties in locating suitable veins for the procedure due to their small size. He suspects poor blood circulation in his legs, which were extensively stripped during his bypass surgery. He also mentions a previous staph infection.    He experienced an episode of severe bloating and weight gain of approximately 7 to 8 pounds about 10 days ago, which was managed with Lasix. His breathing worsened with the weight gain and improved after losing the excess weight.    He recalls a previous sinus infection, which was treated with " decongestants prescribed by a nurse practitioner. This treatment resulted in symptom resolution, although it took some time. He reports a slight improvement in his energy levels but notes that physical exertion, such as climbing stairs, quickly depletes his energy reserves.    He recently experienced an episode of lower back pain, accompanied by dry heaving, which resolved spontaneously. He did not experience any dysuria during this episode. He has a history of kidney stones but does not believe the recent episode was similar.      The following portions of the patient's history were reviewed and updated as appropriate: allergies, current medications, past family history, past medical history, past social history, past surgical history, and problem list.    Review of Systems   Constitutional: Positive for malaise/fatigue.   Cardiovascular:  Positive for dyspnea on exertion. Negative for chest pain, claudication, leg swelling, near-syncope, orthopnea, palpitations, paroxysmal nocturnal dyspnea and syncope.   Respiratory:  Negative for shortness of breath.    Hematologic/Lymphatic: Does not bruise/bleed easily.       Current Outpatient Medications:     aspirin 81 MG EC tablet, Take 1 tablet by mouth Daily., Disp: , Rfl:     atorvastatin (LIPITOR) 40 MG tablet, Take 1 tablet by mouth Daily., Disp: 90 tablet, Rfl: 3    Azelastine HCl 137 MCG/SPRAY solution, Administer 1 spray into the nostril(s) as directed by provider 2 (Two) Times a Day As Needed (nasal congestion) for up to 90 days., Disp: 90 mL, Rfl: 3    cetirizine (zyrTEC) 5 MG tablet, Take 1 tablet by mouth Daily., Disp: , Rfl:     coenzyme Q10 100 MG capsule, Take 1 capsule by mouth Daily., Disp: , Rfl:     furosemide (LASIX) 40 MG tablet, Take 1 tablet by mouth Daily As Needed (weight gain >3 lbs in a day, or >5 lbs in 2 days). Takes once to twice daily, Disp: , Rfl:     losartan (COZAAR) 50 MG tablet, Take 1 tablet by mouth Daily., Disp: 90 tablet, Rfl: 3     MAGNESIUM CHLORIDE PO, Take  by mouth., Disp: , Rfl:     montelukast (SINGULAIR) 10 MG tablet, Take 1 tablet by mouth Every Night. (Patient not taking: Reported on 4/3/2025), Disp: , Rfl:        Objective:      Vitals:    04/03/25 1337   BP: 150/78   Pulse: 73   SpO2: 99%     Vitals and nursing note reviewed.   Constitutional:       General: Not in acute distress.     Appearance: Not in distress.   Neck:      Vascular: No JVD or JVR. JVD normal.   Pulmonary:      Effort: Pulmonary effort is normal.      Breath sounds: Normal breath sounds.   Cardiovascular:      Normal rate. Regular rhythm.      Murmurs: There is a grade 2/6 mid frequency holosystolic murmur at the URSB and ULSB.      No gallop.  No rub.   Pulses:     Intact distal pulses.   Edema:     Peripheral edema absent.   Skin:     General: Skin is warm and dry.   Neurological:      Mental Status: Alert, oriented to person, place, and time and oriented to person, place and time.       Physical Exam      Lab Review:         ECG 12 Lead    Date/Time: 4/3/2025 1:48 PM  Performed by: Corey Chin MD    Authorized by: Corey Chin MD  Comparison: compared with previous ECG from 1/3/2025  Comparison to previous ECG: PVCs are no longer present  Rhythm: sinus rhythm  Rate: normal  BPM: 73  QRS axis: normal  Other findings: non-specific ST-T wave changes    Clinical impression: abnormal EKG        Results        Results for orders placed during the hospital encounter of 09/20/24    Adult Transthoracic Echo Complete w/ Color, Spectral and Contrast if necessary per protocol    Interpretation Summary    Left ventricular systolic function is normal. Left ventricular ejection fraction appears to be 61 - 65%.    Left ventricular wall thickness is consistent with mild concentric hypertrophy.    The left atrial cavity is moderately dilated.    Estimated right ventricular systolic pressure from tricuspid regurgitation is mildly elevated (35-45 mmHg).    The right  ventricular cavity is mildly dilated, with mildly reduced systolic function.    The right atrial cavity is dilated.    Cannot exclude atrial septal defect.    No prior studies available for comparison.        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 - Primary: Unclear whether still contributing to symptoms of exertional dyspnea    Overview   5v cabg 8/1/16 ([facility]) - lima->LAD, svg->diag, svg->om, svg->RPDA->dRCA         Hyperlipidemia LDL goal <70    Chronic diastolic congestive heart failure: Stable, euvolemic with no signs/symptoms of left-sided volume overload    Right ventricular enlargement: Could potentially indicate underlying pulmonary disease and/or left-to-right shunting through small ASD         Recommendations/plans:    Assessment & Plan    Impressions and recommendations:      He remains very limited by exertional dyspnea that is still undifferentiated. He has seen pulmonology but does not have any obvious diagnosis from Dr. Quiroz that fully explains his dyspnea. From a cardiac perspective, the possibility of an anginal equivalent is still being considered, but he did not tolerate Ranexa.  Another alternative would be, though unlikely, shunting through a suspected small ASD, with left-to-right shunting resulting right ventricular enlargement, and exertional dyspnea.  -He will be given Imdur 30 mg daily today and asked to call back in 1 to 2 weeks with an update to see if this is helping his symptoms open (under presumption that they may be an anginal equivalent).   -If it helps, the dosage will be titrated to effect, and a cardiac catheterization will only be scheduled if he can not be made symptom-free.   -If it does not help with symptoms at all, then a cardiac catheterization will be suggested, which will  need to include both diagnostic coronary bypass graft angiography along with left and right heart catheterization. This will enable an oxygen run to see if a small ASD is causing significant enough left-to-right shunting to be a factor. He did have mild right ventricular enlargement and suspicion for a small ASD on a transthoracic echo last fall. If coronary disease does not explain his dyspnea, then perhaps chronic shunting through this small ASD may play a role, and calculating Qp/Qs on the right heart catheterization from an oxygen step-up run would allow for determination of whether this is culpable and therefore also whether it might benefit him to have it closed.     Otherwise, no changes in treatment are recommended today. Follow-up and next steps will be determined based on his return call in 2 weeks.      51 minutes spent on day of service, excluding time spent interpreting ECG    Corey Chin MD  04/03/2025  14:05 CDT    Transcribed from ambient dictation for Corey Chin MD by Corey Chin MD.  04/03/25   13:59 CDT    Patient or patient representative verbalized consent to the visit recording.

## 2025-04-17 ENCOUNTER — TELEPHONE (OUTPATIENT)
Dept: CARDIOLOGY | Facility: CLINIC | Age: 75
End: 2025-04-17
Payer: MEDICARE

## 2025-04-17 NOTE — TELEPHONE ENCOUNTER
Caller: Oj Vale    Relationship: Self    Best call back number: [phone]     What is the best time to reach you: ANYTIME    Who are you requesting to speak with (clinical staff, provider,  specific staff member): SARA    What was the call regarding: PATIENT IS REQUESTING A CALL BACK TO DISCUSS HIS NEW MEDICATION.

## 2025-04-18 NOTE — TELEPHONE ENCOUNTER
Corey Chin MD to Me (Selected Message)        4/18/25 11:42 AM  Okay.  Please advise that he increase the dose from 30 up to 60 mg daily (take 2 of the 30 mg tablets together each morning).     We can go and schedule a cardiac catheterization if he would like, or give some more time to see if this dose increase improves his afternoon symptoms.     Thanks!  Me to Corey Chin MD        4/18/25 11:08 AM  Patient reports doing well in the mornings after taking isosorbide, but by the afternoon he has no energy, gets short of breath with exertion and has chest tightness. He states his SOA may be due to all the allergens in the air this time of year. Hermelinda Jones MA

## 2025-04-18 NOTE — TELEPHONE ENCOUNTER
Notified patient. He will double his dose of isosorbide to 60 mg daily and report back after taking for about a week. He had rather try this first before consenting to the cath. Hermelinda Jones MA

## 2025-05-02 ENCOUNTER — TELEPHONE (OUTPATIENT)
Dept: CARDIOLOGY | Facility: CLINIC | Age: 75
End: 2025-05-02
Payer: MEDICARE

## 2025-05-02 NOTE — TELEPHONE ENCOUNTER
Patient reports that he has not had any adverse reactions after doubling isosorbide to 60 mg daily. His SOA is a little better, but he is still wanting to sleep a lot.     ------------------------------------------------------------  Returned patient's call asking if he has had any chest pain after doubling isosorbide, and if he wanted to proceed with cath. Message is left for the patient to call back to discuss this. Hermelinda Jones MA

## 2025-06-03 ENCOUNTER — TELEPHONE (OUTPATIENT)
Dept: CARDIOLOGY | Facility: CLINIC | Age: 75
End: 2025-06-03
Payer: MEDICARE

## 2025-06-03 NOTE — TELEPHONE ENCOUNTER
English The pt wants to know if you want him to continue taking Isosorbide 60 mg 1 po qd? If so he needs a new prescription faxed to [facility]. He has been out of this med x 1 wk. He does not have any cp. He only has soa with severe exertions. He does not want to proceed with the heart cath.

## 2025-06-07 RX ORDER — ISOSORBIDE MONONITRATE 60 MG/1
60 TABLET, EXTENDED RELEASE ORAL DAILY
Qty: 30 TABLET | Refills: 11 | Status: SHIPPED | OUTPATIENT
Start: 2025-06-07

## 2025-07-02 RX ORDER — ISOSORBIDE MONONITRATE 60 MG/1
60 TABLET, EXTENDED RELEASE ORAL DAILY
OUTPATIENT
Start: 2025-07-02